# Patient Record
Sex: FEMALE | Race: WHITE | NOT HISPANIC OR LATINO | Employment: UNEMPLOYED | URBAN - METROPOLITAN AREA
[De-identification: names, ages, dates, MRNs, and addresses within clinical notes are randomized per-mention and may not be internally consistent; named-entity substitution may affect disease eponyms.]

---

## 2017-02-24 ENCOUNTER — OFFICE VISIT (OUTPATIENT)
Dept: NEUROLOGY | Facility: CLINIC | Age: 82
End: 2017-02-24
Payer: MEDICARE

## 2017-02-24 ENCOUNTER — LAB VISIT (OUTPATIENT)
Dept: LAB | Facility: HOSPITAL | Age: 82
End: 2017-02-24
Attending: PSYCHIATRY & NEUROLOGY
Payer: MEDICARE

## 2017-02-24 ENCOUNTER — TELEPHONE (OUTPATIENT)
Dept: NEUROLOGY | Facility: CLINIC | Age: 82
End: 2017-02-24

## 2017-02-24 VITALS
SYSTOLIC BLOOD PRESSURE: 140 MMHG | DIASTOLIC BLOOD PRESSURE: 72 MMHG | RESPIRATION RATE: 16 BRPM | HEART RATE: 66 BPM | BODY MASS INDEX: 26.82 KG/M2 | HEIGHT: 67 IN | WEIGHT: 170.88 LBS

## 2017-02-24 DIAGNOSIS — M48.02 CERVICAL SPINAL STENOSIS: ICD-10-CM

## 2017-02-24 DIAGNOSIS — G30.1 LATE ONSET ALZHEIMER'S DISEASE WITHOUT BEHAVIORAL DISTURBANCE: Primary | ICD-10-CM

## 2017-02-24 DIAGNOSIS — E53.8 VITAMIN B12 DEFICIENCY: ICD-10-CM

## 2017-02-24 DIAGNOSIS — F02.80 LATE ONSET ALZHEIMER'S DISEASE WITHOUT BEHAVIORAL DISTURBANCE: Primary | ICD-10-CM

## 2017-02-24 DIAGNOSIS — R26.9 ABNORMAL GAIT: ICD-10-CM

## 2017-02-24 DIAGNOSIS — I65.02 VERTEBRAL ARTERY OCCLUSION, LEFT: ICD-10-CM

## 2017-02-24 LAB — VIT B12 SERPL-MCNC: 629 PG/ML

## 2017-02-24 PROCEDURE — 36415 COLL VENOUS BLD VENIPUNCTURE: CPT

## 2017-02-24 PROCEDURE — 99999 PR PBB SHADOW E&M-EST. PATIENT-LVL III: CPT | Mod: PBBFAC,,, | Performed by: PSYCHIATRY & NEUROLOGY

## 2017-02-24 PROCEDURE — 99214 OFFICE O/P EST MOD 30 MIN: CPT | Mod: S$PBB,,, | Performed by: PSYCHIATRY & NEUROLOGY

## 2017-02-24 PROCEDURE — 82607 VITAMIN B-12: CPT

## 2017-02-24 RX ORDER — MEMANTINE HYDROCHLORIDE 10 MG/1
10 TABLET ORAL NIGHTLY
Qty: 30 TABLET | Refills: 11 | Status: SHIPPED | OUTPATIENT
Start: 2017-02-24 | End: 2018-05-28 | Stop reason: SDUPTHER
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
## 2017-02-24 NOTE — TELEPHONE ENCOUNTER
Pt called to cancel her prescription at [facility] and send it to [facility]. Spoke with Vinod at [facility] and script was deleted. Script called in at [facility]. Pt notified

## 2017-02-24 NOTE — MR AVS SNAPSHOT
[facility]  [address] [address]  Phone: [phone]  Fax: [phone]                  Selma Miguel   2017 9:15 AM   Office Visit    Description:  Female : 1932   Provider:  Farrukh Ramirez MD   Department:  [facility]           Reason for Visit     Memory Loss           Diagnoses this Visit        Comments    Late onset Alzheimer's disease without behavioral disturbance    -  Primary     Vitamin B12 deficiency         Cervical spinal stenosis         Vertebral artery occlusion, left         Abnormal gait                To Do List           Future Appointments        Provider Department Dept Phone    3/20/2017 10:00 AM Zach Ayoub MD [facility] [phone]      Goals (5 Years of Data)     None      Follow-Up and Disposition     Return in about 6 months (around 2017).       These Medications        Disp Refills Start End    memantine (NAMENDA) 10 MG Tab 30 tablet 11 2017    Take 1 tablet (10 mg total) by mouth every evening. - Oral    Pharmacy: [address] Ph #: [phone]         [facility] On Call     [facility] On Call Nurse Care Line -  Assistance  Registered nurses in the [facility] provide clinical advisement, health education, appointment booking, and other advisory services.  Call for this free service at [phone].             Medications           Message regarding Medications     Verify the changes and/or additions to your medication regime listed below are the same as discussed with your clinician today.  If any of these changes or additions are incorrect, please notify your healthcare provider.        START taking these NEW medications        Refills    memantine (NAMENDA) 10 MG Tab 11    Sig: Take 1 tablet (10 mg total) by mouth every evening.    Class: Normal    Route: Oral           Verify that the  below list of medications is an accurate representation of the medications you are currently taking.  If none reported, the list may be blank. If incorrect, please contact your healthcare provider. Carry this list with you in case of emergency.           Current Medications     aspirin 81 MG Chew Take 81 mg by mouth once daily.    atorvastatin (LIPITOR) 10 MG tablet Take 1 tablet by mouth once daily.    clopidogrel (PLAVIX) 75 mg tablet Take 1 tablet by mouth once daily.    cyanocobalamin (VITAMIN B-12) 1000 MCG tablet Take 100 mcg by mouth once daily.    donepezil (ARICEPT) 5 MG tablet Take 1/2 nightly for 6 weeks, then increase to one at night    losartan-hydrochlorothiazide 100-25 mg (HYZAAR) 100-25 mg per tablet Take 1 tablet by mouth once daily.    metformin (GLUCOPHAGE) 500 MG tablet Take 1,000 mg by mouth every evening.     multivitamin capsule Take 1 capsule by mouth once daily.    omeprazole (PRILOSEC) 40 MG capsule Take 1 capsule by mouth once daily.    pyridoxine (B-6) 100 MG Tab Take 100 mg by mouth once daily.    vitamin D 1000 units Tab Take 1,000 Units by mouth once daily.    memantine (NAMENDA) 10 MG Tab Take 1 tablet (10 mg total) by mouth every evening.           Clinical Reference Information           Your Vitals Were     BP                   140/72 (BP Location: Right arm, Patient Position: Sitting, BP Method: Manual)           Blood Pressure          Most Recent Value    BP  (!)  140/72      Allergies as of 2/24/2017     Morphine      Immunizations Administered on Date of Encounter - 2/24/2017     None      Orders Placed During Today's Visit     Future Labs/Procedures Expected by Expires    Vitamin B12  2/24/2017 2/24/2018      Language Assistance Services     ATTENTION: Language assistance services are available, free of charge. Please call [phone].      ATENCIÓN: Si jeraldla paola, tiene a marie disposición servicios gratuitos de asistencia lingüística. Llame al [phone].     CHÚ Ý:  N?u b?n nói Ti?ng Vi?t, có các d?ch v? h? tr? ngôn ng? mi?n phí dành cho b?n. G?i s? [phone].         [facility] complies with applicable Federal civil rights laws and does not discriminate on the basis of race, color, national origin, age, disability, or sex.

## 2017-05-02 ENCOUNTER — APPOINTMENT (OUTPATIENT)
Dept: RADIOLOGY | Facility: HOSPITAL | Age: 82
End: 2017-05-02
Payer: MEDICARE

## 2017-05-02 PROCEDURE — 70450 CT HEAD/BRAIN W/O DYE: CPT | Mod: TC

## 2017-05-02 PROCEDURE — 70450 CT HEAD/BRAIN W/O DYE: CPT | Mod: 26,,, | Performed by: RADIOLOGY

## 2017-06-12 RX ORDER — DONEPEZIL HYDROCHLORIDE 5 MG/1
TABLET, FILM COATED ORAL
Qty: 30 TABLET | Refills: 5 | Status: SHIPPED | OUTPATIENT
Start: 2017-06-12 | End: 2018-05-28 | Stop reason: SDUPTHER

## 2018-05-28 ENCOUNTER — OFFICE VISIT (OUTPATIENT)
Dept: NEUROLOGY | Facility: CLINIC | Age: 83
End: 2018-05-28
Payer: MEDICARE

## 2018-05-28 VITALS
SYSTOLIC BLOOD PRESSURE: 106 MMHG | WEIGHT: 206.13 LBS | HEART RATE: 78 BPM | BODY MASS INDEX: 32.35 KG/M2 | HEIGHT: 67 IN | RESPIRATION RATE: 16 BRPM | DIASTOLIC BLOOD PRESSURE: 66 MMHG

## 2018-05-28 DIAGNOSIS — I65.02 VERTEBRAL ARTERY OCCLUSION, LEFT: ICD-10-CM

## 2018-05-28 DIAGNOSIS — F02.80 LATE ONSET ALZHEIMER'S DISEASE WITHOUT BEHAVIORAL DISTURBANCE: ICD-10-CM

## 2018-05-28 DIAGNOSIS — E53.8 VITAMIN B12 DEFICIENCY: ICD-10-CM

## 2018-05-28 DIAGNOSIS — G30.1 LATE ONSET ALZHEIMER'S DISEASE WITHOUT BEHAVIORAL DISTURBANCE: ICD-10-CM

## 2018-05-28 DIAGNOSIS — M48.02 CERVICAL SPINAL STENOSIS: ICD-10-CM

## 2018-05-28 DIAGNOSIS — N39.498 OTHER URINARY INCONTINENCE: Primary | ICD-10-CM

## 2018-05-28 PROCEDURE — 99999 PR PBB SHADOW E&M-EST. PATIENT-LVL III: CPT | Mod: PBBFAC,,, | Performed by: PSYCHIATRY & NEUROLOGY

## 2018-05-28 PROCEDURE — 99214 OFFICE O/P EST MOD 30 MIN: CPT | Mod: S$PBB | Performed by: PSYCHIATRY & NEUROLOGY

## 2018-05-28 PROCEDURE — 99213 OFFICE O/P EST LOW 20 MIN: CPT | Mod: PBBFAC | Performed by: PSYCHIATRY & NEUROLOGY

## 2018-05-28 PROCEDURE — 99999 PR STA SHADOW: CPT | Mod: PBBFAC,,, | Performed by: PSYCHIATRY & NEUROLOGY

## 2018-05-28 RX ORDER — DONEPEZIL HYDROCHLORIDE 5 MG/1
5 TABLET, FILM COATED ORAL NIGHTLY
Qty: 90 TABLET | Refills: 3 | Status: SHIPPED | OUTPATIENT
Start: 2018-05-28 | End: 2019-04-22 | Stop reason: SDUPTHER

## 2018-05-28 RX ORDER — MEMANTINE HYDROCHLORIDE 10 MG/1
10 TABLET ORAL NIGHTLY
Qty: 180 TABLET | Refills: 3 | Status: SHIPPED | OUTPATIENT
Start: 2018-05-28 | End: 2018-06-01 | Stop reason: DRUGHIGH

## 2018-05-28 NOTE — PROGRESS NOTES
HPI:   Selma Miguel is a 85 y.o. female with HTN, DM, and dyslipidemia with not unexpected white matter changes on MRI brain done for headache which is right side locked and nightly (occurs while in bed). 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 Some arthritic changes in the neck / suspect pain is radiating from the neck.   Some falls  In 2015 with 6/2015 MRI C spine showing moderate to severe cervical spinal stenosis and Memory loss/ likely progressing to Alzheimer's dementia    It has been over a year since patient's last visit with me.  She did fall and hit her head a year ago and had unremarkable CT head done in ER (see below)  She was started on Namenda at her last visit with me.  She is tolerating this well.No headaches  Memory is stable.   She is no longer driving and she lives alone. She has a sitter during the day and then family stays with her until bedtime. She does not wander out of the house. No hallucinations.   Her last fall was over a year ago.   She has not neck pain  Patient has incontinence- only over the past 2 months. She feels no burning and no blood in the urine.   No knee pain  No further alopecia    Review of Systems   Constitutional: Negative for fever.   HENT: Negative for nosebleeds.    Eyes: Negative for double vision.   Respiratory: Negative for hemoptysis.    Cardiovascular: Negative for leg swelling.   Gastrointestinal: Negative for blood in stool.   Genitourinary: Negative for hematuria.   Musculoskeletal: Negative for falls.   Skin: Negative for rash.   Neurological: Negative for headaches.   Psychiatric/Behavioral: Positive for memory loss.       Exam:  Gen Appearance, well developed/nourished in no apparent distress  CV: 2+ distal pulses with no edema or swelling  Neuro:  MS: Awake, alert,  Sustains attention.Oriented to time as memorial day but can't recall month.  Recent recall is mildly  to moderately forgetful at times/ remote memory intact, Language is full to spontaneous speech/comprehension. Fund of Knowledge is full.   Up to date on current events  CN:  PERRL, Extraoccular movements and visual fields are full. Normal facial  strength, Tongue and Palate are midline and strong. Shoulder Shrug symmetric and strong.  Motor: Normal bulk, tone, no abnormal movements. 5/5 strength bilateral upper/lower extremities with 1+ reflexes   Sensory: symmetric to  temp, and vibration, Romberg negative  Cerebellar: Finger-nose, Rapid alternating movements intact  Gait: Normal stance, no ataxia with cane but chronic right feet deviation more than left post knee replacement. -- needs walker instead of cane again urged today      Imaging: MRI C spine 6/2015 : Multilevel degenerative changes of the cervical spine contribute to moderate to severe central canal stenosis and moderate to severe neural foraminal narrowing as detailed in the above report.    Lack of a normal flow void in the left vertebral artery which could reflect slow flow or occlusion. Consider correlation with CTA as warranted.        Labs: 12/2015 TSh normal B12 low normal and patient was instructed on taking po b12      4/2016 MRI brain: Atrophy and scatter white matter disease reported  Also some mastoid changes    5/2017 CT head: No acute intracranial abnormalities.    Stable sequela of chronic microvascular ischemic change and senescent changes.    Soft tissue edema overlying the right frontal calvarium without underlying fracture.    4/2016 CMP, TSH, CBC with PCP unremarkable. LDL is 88  2/2017 B12 level well corrected    Assessment/Plan: Selma Miguel is a 85 y.o. female with HTN, DM, and dyslipidemia with not unexpected white matter changes on MRI brain done for headache which is right side locked and nightly (occurs while in bed). 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 Some arthritic changes in the neck / suspect pain is radiating from the neck.   Some falls  In 2015 with 6/2015 MRI C spine showing moderate to severe cervical spinal stenosis and Memory loss/ likely progressing to Alzheimer's dementia    I recommend:  1. Her moderate-severe cervical spinal stenosis could contribute to falls. She refused spine surgical consult-all asymptomatic again currently/ no longer having headache or falls  2. Noted is development of worsening memory loss--progressive with time with unremarkable MRI brain in 2016. Continue B12 for low normal levels prior-improved in 2017.Continue current dose Aricept and Namenda as memory has been more stable lately  3. She is having more recent bladder incontinence- check UA today and if negative I suggested time voiding.   4. She is tolerating living along at night with sitters and family helping during the day. Will need 24 hour supervision if any wandering or further confusion. She no longer drives  5.  She also had some lack of sensation in the feet which suggests possible diabetic neuropathy as well (held on EMG given  patient's request-- she has no active symptoms now). She also has chronic orthopedic problem problems with the knees/ feet deviation-- this all also contributes to falls / fall precautions reviewed. PT helped  Prior.   6. Continue ASA started since vertebral a stenosis found (in addition to Plavix). Also on anti-HTN and statin for CVA prevention/plaque reduction (PCP follows labs). She will also continue Prilosec for esogastitis (benefit for GERD greater than risk of reduced efficacy of plavix as she has never had a stroke)      RTC 1 year

## 2018-05-29 ENCOUNTER — LAB VISIT (OUTPATIENT)
Dept: LAB | Facility: HOSPITAL | Age: 83
End: 2018-05-29
Attending: PSYCHIATRY & NEUROLOGY
Payer: MEDICARE

## 2018-05-29 DIAGNOSIS — N39.498 OTHER URINARY INCONTINENCE: ICD-10-CM

## 2018-05-29 LAB
BACTERIA #/AREA URNS HPF: ABNORMAL /HPF
BILIRUB UR QL STRIP: NEGATIVE
CLARITY UR: CLEAR
COLOR UR: YELLOW
GLUCOSE UR QL STRIP: NEGATIVE
HGB UR QL STRIP: NEGATIVE
KETONES UR QL STRIP: NEGATIVE
LEUKOCYTE ESTERASE UR QL STRIP: ABNORMAL
MICROSCOPIC COMMENT: ABNORMAL
NITRITE UR QL STRIP: NEGATIVE
PH UR STRIP: 5 [PH] (ref 5–8)
PROT UR QL STRIP: NEGATIVE
SP GR UR STRIP: 1.01 (ref 1–1.03)
URN SPEC COLLECT METH UR: ABNORMAL
UROBILINOGEN UR STRIP-ACNC: NEGATIVE EU/DL
WBC #/AREA URNS HPF: 8 /HPF (ref 0–5)

## 2018-05-29 PROCEDURE — 81000 URINALYSIS NONAUTO W/SCOPE: CPT

## 2018-06-01 ENCOUNTER — TELEPHONE (OUTPATIENT)
Dept: NEUROLOGY | Facility: CLINIC | Age: 83
End: 2018-06-01

## 2018-06-01 RX ORDER — MEMANTINE HYDROCHLORIDE 28 MG/1
1 CAPSULE, EXTENDED RELEASE ORAL NIGHTLY
COMMUNITY
End: 2021-05-25

## 2018-06-01 NOTE — TELEPHONE ENCOUNTER
Received a call from patients pharmacy, she does the [facility] pack for her medications, her PCP sent in prescriptions for all her medications and when we sent in prescriptions on Monday there was confusion to the dosage on the Namenda as the PCP has her on Namenda XR 28 mg nightly and we sent in Namenda 10 mg nightly as this is what we had been having patient on. Corrected the medication in the chart as the patient has been on this dose for sometime now clarified with family.

## 2018-06-01 NOTE — TELEPHONE ENCOUNTER
----- Message from Yulia Mcrae MA sent at 2018 10:09 AM CDT -----  Contact: Susan-- [facility]  Selma Miguel  MRN: [id number]  : 1932  PCP: Montrell Trujillo  Home Phone      [phone]  Work Phone      Not on file.  Mobile          [phone]      MESSAGE:  Pharmacist is requesting to speak to you in reference to clarification on patient's Namenda  10 mg. She can be reached at [phone].

## 2018-12-10 ENCOUNTER — TELEPHONE (OUTPATIENT)
Dept: INTERNAL MEDICINE | Facility: CLINIC | Age: 83
End: 2018-12-10

## 2018-12-10 DIAGNOSIS — R41.0 CONFUSION: ICD-10-CM

## 2018-12-10 DIAGNOSIS — R30.0 BURNING WITH URINATION: Primary | ICD-10-CM

## 2018-12-11 ENCOUNTER — CLINICAL SUPPORT (OUTPATIENT)
Dept: INTERNAL MEDICINE | Facility: CLINIC | Age: 83
End: 2018-12-11
Payer: MEDICARE

## 2018-12-11 DIAGNOSIS — R41.0 CONFUSION: ICD-10-CM

## 2018-12-11 DIAGNOSIS — R30.0 BURNING WITH URINATION: ICD-10-CM

## 2018-12-11 LAB
BACTERIA #/AREA URNS AUTO: ABNORMAL /HPF
BILIRUB UR QL STRIP: NEGATIVE
CLARITY UR REFRACT.AUTO: CLEAR
COLOR UR AUTO: ABNORMAL
GLUCOSE UR QL STRIP: NEGATIVE
HGB UR QL STRIP: ABNORMAL
KETONES UR QL STRIP: NEGATIVE
LEUKOCYTE ESTERASE UR QL STRIP: ABNORMAL
MICROSCOPIC COMMENT: ABNORMAL
NITRITE UR QL STRIP: NEGATIVE
PH UR STRIP: 6 [PH] (ref 5–8)
PROT UR QL STRIP: NEGATIVE
RBC #/AREA URNS AUTO: 3 /HPF (ref 0–4)
SP GR UR STRIP: 1 (ref 1–1.03)
SQUAMOUS #/AREA URNS AUTO: 2 /HPF
URN SPEC COLLECT METH UR: ABNORMAL
WBC #/AREA URNS AUTO: 12 /HPF (ref 0–5)

## 2018-12-11 PROCEDURE — 81001 URINALYSIS AUTO W/SCOPE: CPT

## 2018-12-11 PROCEDURE — 87086 URINE CULTURE/COLONY COUNT: CPT

## 2018-12-12 LAB — BACTERIA UR CULT: NORMAL

## 2019-04-22 RX ORDER — DONEPEZIL HYDROCHLORIDE 5 MG/1
TABLET, FILM COATED ORAL
Qty: 90 TABLET | Refills: 3 | Status: SHIPPED | OUTPATIENT
Start: 2019-04-22 | End: 2020-04-23

## 2019-04-27 ENCOUNTER — LAB VISIT (OUTPATIENT)
Dept: LAB | Facility: HOSPITAL | Age: 84
End: 2019-04-27
Attending: FAMILY MEDICINE
Payer: MEDICARE

## 2019-04-27 DIAGNOSIS — E11.9 DM TYPE 2 (DIABETES MELLITUS, TYPE 2): Primary | ICD-10-CM

## 2019-04-27 DIAGNOSIS — I10 BP (HIGH BLOOD PRESSURE): ICD-10-CM

## 2019-04-27 DIAGNOSIS — Z00.01 ENCOUNTER FOR GENERAL ADULT MEDICAL EXAMINATION WITH ABNORMAL FINDINGS: ICD-10-CM

## 2019-04-27 LAB
25(OH)D3+25(OH)D2 SERPL-MCNC: 52 NG/ML (ref 30–96)
ALBUMIN SERPL BCP-MCNC: 3.5 G/DL (ref 3.5–5.2)
ALP SERPL-CCNC: 60 U/L (ref 55–135)
ALT SERPL W/O P-5'-P-CCNC: 13 U/L (ref 10–44)
ANION GAP SERPL CALC-SCNC: 11 MMOL/L (ref 8–16)
AST SERPL-CCNC: 14 U/L (ref 10–40)
BILIRUB SERPL-MCNC: 0.4 MG/DL (ref 0.1–1)
BUN SERPL-MCNC: 18 MG/DL (ref 8–23)
CALCIUM SERPL-MCNC: 9.4 MG/DL (ref 8.7–10.5)
CHLORIDE SERPL-SCNC: 105 MMOL/L (ref 95–110)
CHOLEST SERPL-MCNC: 153 MG/DL (ref 120–199)
CHOLEST/HDLC SERPL: 3.3 {RATIO} (ref 2–5)
CO2 SERPL-SCNC: 26 MMOL/L (ref 23–29)
CREAT SERPL-MCNC: 1 MG/DL (ref 0.5–1.4)
ERYTHROCYTE [DISTWIDTH] IN BLOOD BY AUTOMATED COUNT: 16.3 % (ref 11.5–14.5)
EST. GFR  (AFRICAN AMERICAN): 59 ML/MIN/1.73 M^2
EST. GFR  (NON AFRICAN AMERICAN): 51 ML/MIN/1.73 M^2
GLUCOSE SERPL-MCNC: 142 MG/DL (ref 70–110)
HCT VFR BLD AUTO: 46.5 % (ref 37–48.5)
HDLC SERPL-MCNC: 46 MG/DL (ref 40–75)
HDLC SERPL: 30.1 % (ref 20–50)
HGB BLD-MCNC: 14.8 G/DL (ref 12–16)
LDLC SERPL CALC-MCNC: 84 MG/DL (ref 63–159)
MCH RBC QN AUTO: 29.1 PG (ref 27–31)
MCHC RBC AUTO-ENTMCNC: 31.8 G/DL (ref 32–36)
MCV RBC AUTO: 91 FL (ref 82–98)
NONHDLC SERPL-MCNC: 107 MG/DL
PLATELET # BLD AUTO: 274 K/UL (ref 150–350)
PMV BLD AUTO: 10.6 FL (ref 9.2–12.9)
POTASSIUM SERPL-SCNC: 4.6 MMOL/L (ref 3.5–5.1)
PROT SERPL-MCNC: 6.6 G/DL (ref 6–8.4)
RBC # BLD AUTO: 5.09 M/UL (ref 4–5.4)
SODIUM SERPL-SCNC: 142 MMOL/L (ref 136–145)
TRIGL SERPL-MCNC: 115 MG/DL (ref 30–150)
WBC # BLD AUTO: 6.66 K/UL (ref 3.9–12.7)

## 2019-04-27 PROCEDURE — 80053 COMPREHEN METABOLIC PANEL: CPT

## 2019-04-27 PROCEDURE — 82306 VITAMIN D 25 HYDROXY: CPT

## 2019-04-27 PROCEDURE — 85027 COMPLETE CBC AUTOMATED: CPT

## 2019-04-27 PROCEDURE — 82652 VIT D 1 25-DIHYDROXY: CPT

## 2019-04-27 PROCEDURE — 80061 LIPID PANEL: CPT

## 2019-04-27 PROCEDURE — 36415 COLL VENOUS BLD VENIPUNCTURE: CPT

## 2019-04-30 LAB — 1,25(OH)2D3 SERPL-MCNC: 39 PG/ML (ref 20–79)

## 2019-08-05 ENCOUNTER — CLINICAL SUPPORT (OUTPATIENT)
Dept: INTERNAL MEDICINE | Facility: CLINIC | Age: 84
End: 2019-08-05
Payer: MEDICARE

## 2019-08-05 ENCOUNTER — TELEPHONE (OUTPATIENT)
Dept: INTERNAL MEDICINE | Facility: CLINIC | Age: 84
End: 2019-08-05

## 2019-08-05 DIAGNOSIS — F44.89 CONFUSION STATE: Primary | ICD-10-CM

## 2019-08-05 DIAGNOSIS — F44.89 CONFUSION STATE: ICD-10-CM

## 2019-08-05 DIAGNOSIS — N39.0 ACUTE UTI: ICD-10-CM

## 2019-08-05 LAB
BILIRUB UR QL STRIP: NEGATIVE
CLARITY UR REFRACT.AUTO: CLEAR
COLOR UR AUTO: ABNORMAL
GLUCOSE UR QL STRIP: NEGATIVE
HGB UR QL STRIP: NEGATIVE
KETONES UR QL STRIP: NEGATIVE
LEUKOCYTE ESTERASE UR QL STRIP: ABNORMAL
MICROSCOPIC COMMENT: NORMAL
NITRITE UR QL STRIP: NEGATIVE
PH UR STRIP: 7 [PH] (ref 5–8)
PROT UR QL STRIP: NEGATIVE
RBC #/AREA URNS AUTO: 1 /HPF (ref 0–4)
SP GR UR STRIP: 1 (ref 1–1.03)
SQUAMOUS #/AREA URNS AUTO: 1 /HPF
URN SPEC COLLECT METH UR: ABNORMAL
WBC #/AREA URNS AUTO: 2 /HPF (ref 0–5)

## 2019-08-05 PROCEDURE — 87086 URINE CULTURE/COLONY COUNT: CPT

## 2019-08-05 PROCEDURE — 81001 URINALYSIS AUTO W/SCOPE: CPT

## 2019-08-06 LAB
BACTERIA UR CULT: NORMAL
BACTERIA UR CULT: NORMAL

## 2019-08-08 ENCOUNTER — TELEPHONE (OUTPATIENT)
Dept: INTERNAL MEDICINE | Facility: CLINIC | Age: 84
End: 2019-08-08

## 2019-08-08 DIAGNOSIS — N39.0 URINARY TRACT INFECTION WITHOUT HEMATURIA, SITE UNSPECIFIED: Primary | ICD-10-CM

## 2019-08-08 RX ORDER — CIPROFLOXACIN 500 MG/1
500 TABLET ORAL 2 TIMES DAILY
Qty: 10 TABLET | Refills: 0 | Status: SHIPPED | OUTPATIENT
Start: 2019-08-08 | End: 2019-08-13

## 2019-08-13 ENCOUNTER — OFFICE VISIT (OUTPATIENT)
Dept: NEUROLOGY | Facility: CLINIC | Age: 84
End: 2019-08-13
Payer: MEDICARE

## 2019-08-13 VITALS
WEIGHT: 204.56 LBS | BODY MASS INDEX: 32.11 KG/M2 | HEART RATE: 89 BPM | HEIGHT: 67 IN | SYSTOLIC BLOOD PRESSURE: 126 MMHG | RESPIRATION RATE: 16 BRPM | DIASTOLIC BLOOD PRESSURE: 72 MMHG

## 2019-08-13 DIAGNOSIS — G30.1 LATE ONSET ALZHEIMER'S DISEASE WITHOUT BEHAVIORAL DISTURBANCE: Primary | ICD-10-CM

## 2019-08-13 DIAGNOSIS — E53.8 VITAMIN B12 DEFICIENCY: ICD-10-CM

## 2019-08-13 DIAGNOSIS — M48.02 CERVICAL SPINAL STENOSIS: ICD-10-CM

## 2019-08-13 DIAGNOSIS — F02.80 LATE ONSET ALZHEIMER'S DISEASE WITHOUT BEHAVIORAL DISTURBANCE: Primary | ICD-10-CM

## 2019-08-13 PROCEDURE — 99214 OFFICE O/P EST MOD 30 MIN: CPT | Mod: S$GLB,,, | Performed by: PSYCHIATRY & NEUROLOGY

## 2019-08-13 PROCEDURE — 3288F FALL RISK ASSESSMENT DOCD: CPT | Mod: CPTII,S$GLB,, | Performed by: PSYCHIATRY & NEUROLOGY

## 2019-08-13 PROCEDURE — 99999 PR PBB SHADOW E&M-EST. PATIENT-LVL III: CPT | Mod: PBBFAC,,, | Performed by: PSYCHIATRY & NEUROLOGY

## 2019-08-13 PROCEDURE — 1100F PR PT FALLS ASSESS DOC 2+ FALLS/FALL W/INJURY/YR: ICD-10-PCS | Mod: CPTII,S$GLB,, | Performed by: PSYCHIATRY & NEUROLOGY

## 2019-08-13 PROCEDURE — 99999 PR PBB SHADOW E&M-EST. PATIENT-LVL III: ICD-10-PCS | Mod: PBBFAC,,, | Performed by: PSYCHIATRY & NEUROLOGY

## 2019-08-13 PROCEDURE — 3288F PR FALLS RISK ASSESSMENT DOCUMENTED: ICD-10-PCS | Mod: CPTII,S$GLB,, | Performed by: PSYCHIATRY & NEUROLOGY

## 2019-08-13 PROCEDURE — 1100F PTFALLS ASSESS-DOCD GE2>/YR: CPT | Mod: CPTII,S$GLB,, | Performed by: PSYCHIATRY & NEUROLOGY

## 2019-08-13 PROCEDURE — 99214 PR OFFICE/OUTPT VISIT, EST, LEVL IV, 30-39 MIN: ICD-10-PCS | Mod: S$GLB,,, | Performed by: PSYCHIATRY & NEUROLOGY

## 2019-08-13 RX ORDER — TRAZODONE HYDROCHLORIDE 50 MG/1
50 TABLET ORAL NIGHTLY
Qty: 30 TABLET | Refills: 11 | Status: SHIPPED | OUTPATIENT
Start: 2019-08-13 | End: 2020-06-22

## 2019-08-13 RX ORDER — LOSARTAN POTASSIUM 50 MG/1
50 TABLET ORAL NIGHTLY
COMMUNITY
End: 2021-05-25

## 2019-08-13 RX ORDER — ESCITALOPRAM OXALATE 10 MG/1
10 TABLET ORAL NIGHTLY
COMMUNITY
End: 2019-08-13

## 2019-08-13 NOTE — PROGRESS NOTES
HPI:   Selma Miguel is a 85 y.o. female with HTN, DM, and dyslipidemia with not unexpected white matter changes on MRI brain done for headache which is right side locked and nightly (occurs while in bed). 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 Some arthritic changes in the neck / suspect pain is radiating from the neck.   Some falls  In  with 2015 MRI C spine showing moderate to severe cervical spinal stenosis and Memory loss/ likely progressing to Alzheimer's dementia      Patient is here for her yearly follow up.   UA unremarkable at the last visit  The patient is on Namenda XR\    For the past 6 months, she has been worsening.   Family today states she has some difficulty with short term memory which continues and over time she has had some visual hallucinations. These are well formed- she is seeing things like young children. She had wandered out into her yard last week thinking she was at a  and could not find the doors.  She is living alone. Hallucinations are upsetting to the patient.   Last week, had UA with PCP and was treated for this by PCP (no organisms isolated). She was just treated on Thursday for presumed UTI.   Patient has a sitter during the day and she is left alone at bedtime. Except family did increase supervision since she has gotten worse and had this one wandering spell with UTI    She is on Lexapro. Mood is good.     No neck pain. One fall since the last visit without injury    Review of Systems   Unable to perform ROS: Dementia       Exam:  Gen Appearance, well developed/nourished in no apparent distress  CV: 2+ distal pulses with no edema or swelling  Neuro:  MS: Awake, alert,  Sustains attention.Oriented to time as August only.  Recent recall is moderately forgetful/ remote memory intact, Language is full to spontaneous speech/comprehension. Fund of Knowledge is full.   CN:  PERRL,  Extraoccular movements and visual fields are full. Normal facial  strength, Tongue and Palate are midline and strong. Shoulder Shrug symmetric and strong.  Motor: Normal bulk, tone, no abnormal movements. 5/5 strength bilateral upper/lower extremities with 1+ reflexes   Sensory: symmetric to  temp, and vibration, Romberg negative  Cerebellar: Finger-nose, Rapid alternating movements intact  Gait: Normal stance, no ataxia with cane but chronic right feet deviation more than left post knee replacement. -- needs walker instead of cane again urged today      Imaging: MRI C spine 6/2015 : Multilevel degenerative changes of the cervical spine contribute to moderate to severe central canal stenosis and moderate to severe neural foraminal narrowing as detailed in the above report.    Lack of a normal flow void in the left vertebral artery which could reflect slow flow or occlusion. Consider correlation with CTA as warranted.        Labs: 12/2015 TSh normal B12 low normal and patient was instructed on taking po b12      4/2016 MRI brain: Atrophy and scatter white matter disease reported  Also some mastoid changes    5/2017 CT head: No acute intracranial abnormalities.    Stable sequela of chronic microvascular ischemic change and senescent changes.    Soft tissue edema overlying the right frontal calvarium without underlying fracture.    Labs: 2019 [facility] reviewed    Assessment/Plan: Selma Miguel is a 86 y.o. female with HTN, DM, and dyslipidemia with not unexpected white matter changes on MRI brain done for headache which is right side locked and nightly (occurs while in bed). 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 Some arthritic changes in the neck / suspect pain is radiating from the neck.   Some falls  In 2015 with 6/2015 MRI C spine showing moderate to severe cervical spinal stenosis and Memory loss/ likely progressing to  Alzheimer's dementia    I recommend:  1. Her moderate-severe cervical spinal stenosis could contribute to falls. She refused spine surgical consult prior-all asymptomatic again currently/ no longer having headache or and falls are no worse/better  2. Noted is development of worsening memory loss--progressive with time with unremarkable MRI brain in 2016. Continue B12 for low normal and follow up B12 level at the next visit  -Continue current dose Aricept and Namenda   3. I suggested time voiding for her bladder incontinence prior  4. She had a wandering spell in light of recently treated UTI  -I recommend 24 hour supervision at this point  -D/c Lexapro and try Trazodone per orders Unless sedation, mood changes or other side effects as she has had some chronic hallucinations  -Patient should not be alone unless she is consistently improved on Trazodone and with time away from UTI (look for at least 3 weeks without hallucinations and wandering)  5.  She also had some lack of sensation in the feet which suggests possible diabetic neuropathy as well (held on EMG given  patient's request-- she has no active symptoms now). She also has chronic orthopedic problem problems with the knees/ feet deviation-- this all also contributes to falls / fall precautions reviewed. PT helped  Prior.   6. Continue ASA started since vertebral a stenosis found (in addition to Plavix). Also on anti-HTN and statin for CVA prevention/plaque reduction (PCP follows labs). She will also continue Prilosec for esogastitis (benefit for GERD greater than risk of reduced efficacy of plavix as she has never had a stroke)    RTC 4 months

## 2019-08-14 ENCOUNTER — TELEPHONE (OUTPATIENT)
Dept: NEUROLOGY | Facility: CLINIC | Age: 84
End: 2019-08-14

## 2019-08-14 NOTE — TELEPHONE ENCOUNTER
----- Message from Balbina Mckeon sent at 2019 12:23 PM CDT -----  Contact: Sheri- Daughter  Selma Miguel  MRN: [id number]  : 1932  PCP: Montrell Trujillo  Home Phone      [phone]  Work Phone      Not on file.  Mobile          [phone]      MESSAGE:   Sheri would like someone to call her concerning questions about her mother.  Did not want to give any further information besides they are having a family meeting tonight and has a few questions.     Phone:  [phone]

## 2019-08-14 NOTE — TELEPHONE ENCOUNTER
Patient's daughter ,Kayden, calling in with concerns of medication changes made at last visit. Kennedy states it was discussed to discontinue Lexapro, however they are concerned that she would need to be weaned off of this medication. As of now they are continuing her at 10 mg every evening due to concerns. They have not yet started the Trazodone, they are having a family meeting for their mother's care and would like to discuss this further before deciding to start her on it. They now have concerns Mrs. Miguel would need 24/7 care if she began this medication. Daughter states that they have trust in Dr. Ramirez but they did research the mediation, Trazodone and have concerns. These concerns are the side effects out weighing the risks. Sheri states the side effects they are concerned about are sedation and drowsiness and they do not want her walking around in a zombie like state or begin forgetting or not speaking as she is now.  Mrs. Miguel has sitters during the day at this time and the family has placed a camera in her bedroom and living room which they can view from anywhere on their cell phones for nightly use. The family stays with her until she is in bed for the night, she does get up but only to use the restroom.     Sheri is asking about keeping Mrs. Miguel on the Lexapro at this time and monitoring her hallucinations. If this is not the best option she asking if it is possible to wean her off the Lexapro and start her on a lowest dose of Trazodone.       Please advise.

## 2019-08-15 NOTE — TELEPHONE ENCOUNTER
Please let her know that Trazodone is in a similar class of medications as Lexapro so Lexapro would not need to be weaned to start Trazodone.  The benefits are greater than the risks, but if they would like to keep meds as is, that is fine. Its just that Trazodone should benefit her more than Lexapro at this point.   Trazodone has been shown in studies to reduce or stop hallucinations in elderly patient's with memory loss.  The patient needs to be monitored 24 hours a day if she is hallucinating or wandering. There are a lot of different ways for families to do this (sitters, cameras, Nursing home placement, having family live with this patient). As long as she is safe with there method and she is continuously monitored, that is the priority.

## 2019-08-15 NOTE — TELEPHONE ENCOUNTER
Trazodone will be started this coming Sunday. Family will continue to monitor her through the camera at this time.

## 2019-12-12 ENCOUNTER — OFFICE VISIT (OUTPATIENT)
Dept: NEUROLOGY | Facility: CLINIC | Age: 84
End: 2019-12-12
Payer: MEDICARE

## 2019-12-12 VITALS
SYSTOLIC BLOOD PRESSURE: 136 MMHG | HEART RATE: 76 BPM | HEIGHT: 67 IN | DIASTOLIC BLOOD PRESSURE: 82 MMHG | BODY MASS INDEX: 32.87 KG/M2 | RESPIRATION RATE: 16 BRPM | WEIGHT: 209.44 LBS

## 2019-12-12 DIAGNOSIS — I65.02 OCCLUSION OF LEFT VERTEBRAL ARTERY: ICD-10-CM

## 2019-12-12 DIAGNOSIS — F02.80 ALZHEIMER'S DEMENTIA WITHOUT BEHAVIORAL DISTURBANCE, UNSPECIFIED TIMING OF DEMENTIA ONSET: ICD-10-CM

## 2019-12-12 DIAGNOSIS — G30.9 ALZHEIMER'S DEMENTIA WITHOUT BEHAVIORAL DISTURBANCE, UNSPECIFIED TIMING OF DEMENTIA ONSET: ICD-10-CM

## 2019-12-12 DIAGNOSIS — R44.3 HALLUCINATIONS: ICD-10-CM

## 2019-12-12 DIAGNOSIS — G47.00 INSOMNIA, UNSPECIFIED TYPE: ICD-10-CM

## 2019-12-12 DIAGNOSIS — E53.8 B12 DEFICIENCY: ICD-10-CM

## 2019-12-12 DIAGNOSIS — M48.02 CERVICAL SPINAL STENOSIS: Primary | ICD-10-CM

## 2019-12-12 PROCEDURE — 1100F PTFALLS ASSESS-DOCD GE2>/YR: CPT | Mod: CPTII,S$GLB,, | Performed by: NURSE PRACTITIONER

## 2019-12-12 PROCEDURE — 99214 PR OFFICE/OUTPT VISIT, EST, LEVL IV, 30-39 MIN: ICD-10-PCS | Mod: S$GLB,,, | Performed by: NURSE PRACTITIONER

## 2019-12-12 PROCEDURE — 99214 OFFICE O/P EST MOD 30 MIN: CPT | Mod: S$GLB,,, | Performed by: NURSE PRACTITIONER

## 2019-12-12 PROCEDURE — 3288F FALL RISK ASSESSMENT DOCD: CPT | Mod: CPTII,S$GLB,, | Performed by: NURSE PRACTITIONER

## 2019-12-12 PROCEDURE — 1100F PR PT FALLS ASSESS DOC 2+ FALLS/FALL W/INJURY/YR: ICD-10-PCS | Mod: CPTII,S$GLB,, | Performed by: NURSE PRACTITIONER

## 2019-12-12 PROCEDURE — 99999 PR PBB SHADOW E&M-EST. PATIENT-LVL III: CPT | Mod: PBBFAC,,, | Performed by: NURSE PRACTITIONER

## 2019-12-12 PROCEDURE — 1126F AMNT PAIN NOTED NONE PRSNT: CPT | Mod: S$GLB,,, | Performed by: NURSE PRACTITIONER

## 2019-12-12 PROCEDURE — 1126F PR PAIN SEVERITY QUANTIFIED, NO PAIN PRESENT: ICD-10-PCS | Mod: S$GLB,,, | Performed by: NURSE PRACTITIONER

## 2019-12-12 PROCEDURE — 1159F MED LIST DOCD IN RCRD: CPT | Mod: S$GLB,,, | Performed by: NURSE PRACTITIONER

## 2019-12-12 PROCEDURE — 3288F PR FALLS RISK ASSESSMENT DOCUMENTED: ICD-10-PCS | Mod: CPTII,S$GLB,, | Performed by: NURSE PRACTITIONER

## 2019-12-12 PROCEDURE — 99999 PR PBB SHADOW E&M-EST. PATIENT-LVL III: ICD-10-PCS | Mod: PBBFAC,,, | Performed by: NURSE PRACTITIONER

## 2019-12-12 PROCEDURE — 1159F PR MEDICATION LIST DOCUMENTED IN MEDICAL RECORD: ICD-10-PCS | Mod: S$GLB,,, | Performed by: NURSE PRACTITIONER

## 2019-12-12 NOTE — PROGRESS NOTES
HPI:  Selma Miguel is a 87 y.o. female with HTN, DM, and dyslipidemia with not unexpected white matter changes on MRI brain done for headache which is right side locked and nightly (occurs while in bed). 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 Some arthritic changes in the neck / suspect pain is radiating from the neck. Some falls  In 2015 with 6/2015 MRI C spine showing moderate to severe cervical spinal stenosis and Memory loss/ likely progressing to Alzheimer's dementia.     She presents today for a follow up visit. Lexapro trained to Trazodone at her last visit, which was very effective for her insomnia and her hallucinations. No wandering. This only occurred once when she had an UTI.     No falls. Good appetite.     Short term memory loss is mildly progressed since her last visit.     No neck pain.     Review of Systems   Unable to perform ROS: Dementia       Exam:  Gen Appearance, well developed/nourished in no apparent distress  CV: 2+ distal pulses with no edema or swelling  Neuro:  MS: Awake, alert,  Sustains attention.Oriented to time as August only.  Recent recall is moderately forgetful/ remote memory intact, Language is full to spontaneous speech/comprehension. Fund of Knowledge is full.   CN:  PERRL, Extraoccular movements and visual fields are full. Normal facial  strength, Tongue and Palate are midline and strong. Shoulder Shrug symmetric and strong.  Motor: Normal bulk, tone, no abnormal movements. 5/5 strength bilateral upper/lower extremities with 1+ reflexes   Sensory: symmetric to  temp, and vibration, Romberg negative  Cerebellar: Finger-nose, Rapid alternating movements intact  Gait: Normal stance, no ataxia with cane but chronic right feet deviation more than left post knee replacement. -- needs walker instead of cane again urged today    Imaging:   MRI C spine 6/2015 : Multilevel degenerative changes  of the cervical spine contribute to moderate to severe central canal stenosis and moderate to severe neural foraminal narrowing as detailed in the above report.    Lack of a normal flow void in the left vertebral artery which could reflect slow flow or occlusion. Consider correlation with CTA as warranted.    Labs:   12/2015 TSh normal B12 low normal and patient was instructed on taking po b12    4/2016 MRI Brain: Atrophy and scatter white matter disease reported  Also some mastoid changes    5/2017 CT head: No acute intracranial abnormalities.    Stable sequela of chronic microvascular ischemic change and senescent changes.    Soft tissue edema overlying the right frontal calvarium without underlying fracture.    Labs: 2019 CMP reviewed    Assessment/Plan: Selma Miguel is a 87 y.o. female with HTN, DM, and dyslipidemia with not unexpected white matter changes on MRI brain done for headache which is right side locked and nightly (occurs while in bed). 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 Some arthritic changes in the neck / suspect pain is radiating from the neck. Some falls in 2015 with 6/2015 MRI C spine showing moderate to severe cervical spinal stenosis and Memory loss/ likely progressing to Alzheimer's dementia.     I recommend:  1. Her moderate-severe cervical spinal stenosis could contribute to falls. She refused spine surgical consult prior-all asymptomatic again currently/ no longer having headache or and falls are no worse/better  2. Noted is development of worsening memory loss--progressive with time with unremarkable MRI brain in 2016. Continue B12 for low normal and check B12 level today.   -Continue current dose Aricept and Namenda   3. I suggested time voiding for her bladder incontinence prior  4. She had a wandering spell in light of UTI  -recommend 24 hour supervision at this point.   -Insomnia and  hallucinations well controlled on Trazodone, which will continue.   -Took Lexapro prior for mood.   5.  She also had some lack of sensation in the feet which suggests possible diabetic neuropathy as well (held on EMG given  patient's request-- she has no active symptoms now). She also has chronic orthopedic problem problems with the knees/ feet deviation-- this all also contributes to falls / fall precautions reviewed. PT helped  Prior.   6. Continue ASA started since vertebral a stenosis found (in addition to Plavix). Also on anti-HTN and statin for CVA prevention/plaque reduction (PCP follows labs). She will also continue Prilosec for esogastitis (benefit for GERD greater than risk of reduced efficacy of plavix as she has never had a stroke)    RTC 6 months

## 2019-12-13 ENCOUNTER — CLINICAL SUPPORT (OUTPATIENT)
Dept: INTERNAL MEDICINE | Facility: CLINIC | Age: 84
End: 2019-12-13
Payer: MEDICARE

## 2019-12-13 DIAGNOSIS — E53.8 B12 DEFICIENCY: ICD-10-CM

## 2019-12-13 LAB — VIT B12 SERPL-MCNC: 1251 PG/ML (ref 210–950)

## 2019-12-13 PROCEDURE — 82607 VITAMIN B-12: CPT

## 2020-02-11 ENCOUNTER — OFFICE VISIT (OUTPATIENT)
Dept: NEUROLOGY | Facility: CLINIC | Age: 85
End: 2020-02-11
Payer: MEDICARE

## 2020-02-11 VITALS
OXYGEN SATURATION: 95 % | HEART RATE: 91 BPM | HEIGHT: 67 IN | SYSTOLIC BLOOD PRESSURE: 134 MMHG | WEIGHT: 207.25 LBS | DIASTOLIC BLOOD PRESSURE: 78 MMHG | BODY MASS INDEX: 32.53 KG/M2 | RESPIRATION RATE: 16 BRPM

## 2020-02-11 DIAGNOSIS — M48.02 CERVICAL SPINAL STENOSIS: ICD-10-CM

## 2020-02-11 DIAGNOSIS — G30.9 ALZHEIMER'S DEMENTIA WITHOUT BEHAVIORAL DISTURBANCE, UNSPECIFIED TIMING OF DEMENTIA ONSET: Primary | ICD-10-CM

## 2020-02-11 DIAGNOSIS — F02.80 ALZHEIMER'S DEMENTIA WITHOUT BEHAVIORAL DISTURBANCE, UNSPECIFIED TIMING OF DEMENTIA ONSET: Primary | ICD-10-CM

## 2020-02-11 DIAGNOSIS — E53.8 B12 DEFICIENCY: ICD-10-CM

## 2020-02-11 PROCEDURE — 99214 PR OFFICE/OUTPT VISIT, EST, LEVL IV, 30-39 MIN: ICD-10-PCS | Mod: S$GLB,,, | Performed by: PSYCHIATRY & NEUROLOGY

## 2020-02-11 PROCEDURE — 1101F PT FALLS ASSESS-DOCD LE1/YR: CPT | Mod: CPTII,S$GLB,, | Performed by: PSYCHIATRY & NEUROLOGY

## 2020-02-11 PROCEDURE — 1126F AMNT PAIN NOTED NONE PRSNT: CPT | Mod: S$GLB,,, | Performed by: PSYCHIATRY & NEUROLOGY

## 2020-02-11 PROCEDURE — 1159F PR MEDICATION LIST DOCUMENTED IN MEDICAL RECORD: ICD-10-PCS | Mod: S$GLB,,, | Performed by: PSYCHIATRY & NEUROLOGY

## 2020-02-11 PROCEDURE — 1101F PR PT FALLS ASSESS DOC 0-1 FALLS W/OUT INJ PAST YR: ICD-10-PCS | Mod: CPTII,S$GLB,, | Performed by: PSYCHIATRY & NEUROLOGY

## 2020-02-11 PROCEDURE — 1159F MED LIST DOCD IN RCRD: CPT | Mod: S$GLB,,, | Performed by: PSYCHIATRY & NEUROLOGY

## 2020-02-11 PROCEDURE — 1126F PR PAIN SEVERITY QUANTIFIED, NO PAIN PRESENT: ICD-10-PCS | Mod: S$GLB,,, | Performed by: PSYCHIATRY & NEUROLOGY

## 2020-02-11 PROCEDURE — 99214 OFFICE O/P EST MOD 30 MIN: CPT | Mod: S$GLB,,, | Performed by: PSYCHIATRY & NEUROLOGY

## 2020-02-11 PROCEDURE — 99999 PR PBB SHADOW E&M-EST. PATIENT-LVL III: CPT | Mod: PBBFAC,,, | Performed by: PSYCHIATRY & NEUROLOGY

## 2020-02-11 PROCEDURE — 99999 PR PBB SHADOW E&M-EST. PATIENT-LVL III: ICD-10-PCS | Mod: PBBFAC,,, | Performed by: PSYCHIATRY & NEUROLOGY

## 2020-02-11 NOTE — PROGRESS NOTES
"HPI:   Selma Miguel is a 85 y.o. female with HTN, DM, and dyslipidemia with not unexpected white matter changes on MRI brain done for headache which is right side locked and nightly (occurs while in bed). 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 Some arthritic changes in the neck / suspect pain is radiating from the neck.   Some falls  In 2015 with 6/2015 MRI C spine showing moderate to severe cervical spinal stenosis and Memory loss/ likely progressing to Alzheimer's dementia      Since the last visit with me, the patient has been seeing NP, Keren Maharaj, in Neurology in this clinic  She is here for her 6 months follow up    Sleep is good  Hallucinations are well controlled (rarely benign stuff)  Memory is worsening over time especially short term memory  Lives alone with family there daily and she has a sitter during the day as well.   Not wandering.  Falls not active  Numb feet not active  She os able to call family when needed  She often makes some comments that she is "looking for a man" and family reassures this      Review of Systems   Unable to perform ROS: Dementia       Exam:  Gen Appearance, well developed/nourished in no apparent distress  CV: 2+ distal pulses with no edema or swelling  Neuro:  MS: Awake, alert,  Sustains attention.Not oriented fully to time or situation.  Recent recall is moderately forgetful/ remote memory intact, Language is full to spontaneous speech/comprehension. Fund of Knowledge is full.   CN:  PERRL, Extraoccular movements and visual fields are full. Normal facial  strength, Tongue and Palate are midline and strong. Shoulder Shrug symmetric and strong.  Motor: Normal bulk, tone, no abnormal movements. 5/5 strength bilateral upper/lower extremities with 1+ reflexes   Sensory: symmetric to  temp, and vibration, Romberg negative  Cerebellar: Finger-nose, Rapid alternating movements " intact  Gait: Normal stance, no ataxia with cane but chronic right feet deviation more than left post knee replacement. -- needs walker instead of cane again urged today      Imaging: MRI C spine 6/2015 : Multilevel degenerative changes of the cervical spine contribute to moderate to severe central canal stenosis and moderate to severe neural foraminal narrowing as detailed in the above report.    Lack of a normal flow void in the left vertebral artery which could reflect slow flow or occlusion. Consider correlation with CTA as warranted.        Labs: 12/2015 TSh normal B12 low normal and patient was instructed on taking po b12      4/2016 MRI brain: Atrophy and scatter white matter disease reported  Also some mastoid changes    5/2017 CT head: No acute intracranial abnormalities.    Stable sequela of chronic microvascular ischemic change and senescent changes.    Soft tissue edema overlying the right frontal calvarium without underlying fracture.    Labs: 2019 CMP reviewed  2019 B12 well repleted    Assessment/Plan: Selma Miguel is a 87 y.o. female with HTN, DM, and dyslipidemia with not unexpected white matter changes on MRI brain done for headache which is right side locked and nightly (occurs while in bed). 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 Some arthritic changes in the neck / suspect pain is radiating from the neck.   Some falls  In 2015 with 6/2015 MRI C spine showing moderate to severe cervical spinal stenosis and Memory loss/ likely progressing to Alzheimer's dementia    I recommend:  1. Her moderate-severe cervical spinal stenosis could contribute to falls. She refused spine surgical consult prior-all asymptomatic again currently/ no longer having headache or and falls are not active  2. Noted is development of worsening memory loss--progressive with time with unremarkable MRI brain in 2016. Continue B12 for  low normal B12 levels (well repleted in 2019)  -Continue current dose Aricept and Namenda   3. Suggested time voiding for her bladder incontinence prior  4. She had a wandering spell in light of  UTI in 2010  -Recommend 24 hour supervision if hallucinations worsen again or if wandering. Tolerating staying alone at night with family very close by and sitters during the day.  -insomnia and hallucinations well controlled on Trazodone, which will continue instead of lexapro for mood  5.  She also had some lack of sensation in the feet which suggests possible diabetic neuropathy as well (held on EMG given  patient's request-- she has no active symptoms now). She also has chronic orthopedic problem problems with the knees/ feet deviation-- this all also contributes to falls / fall precautions reviewed. PT helped  Prior.   6. Continue ASA started since vertebral a stenosis found (in addition to Plavix). Also on anti-HTN and statin for CVA prevention/plaque reduction (PCP follows labs). She will also continue Prilosec for esogastitis (benefit for GERD greater than risk of reduced efficacy of plavix as she has never had a stroke)    RTC  6 months

## 2020-02-20 ENCOUNTER — TELEPHONE (OUTPATIENT)
Dept: NEUROLOGY | Facility: CLINIC | Age: 85
End: 2020-02-20

## 2020-02-20 NOTE — TELEPHONE ENCOUNTER
Patient's daughter, Sheri, notified with instructions for sleep practice and understanding was voiced.

## 2020-02-20 NOTE — TELEPHONE ENCOUNTER
Daughter states that since the night after her last visit, she has been staying up later and later, as late as 3-4 in the morning. A worker has observed pt nodding off during the day but did not state specific durations. Daughter said that trazadone has helped with hallucinations but states pt has been a lot more emotional about small things.

## 2020-02-20 NOTE — TELEPHONE ENCOUNTER
Currently, the best way to treat her sleep and mood is to improve her sleep times. It sounds like her days and nights are confused.     Review the following sleep hygiene as treatment:   Limit naps to not more than 2 per day, not longer than 20 minutes and not after 3pm.  Bed time 8 hours prior to wake time each and every day.  That is: For wake time of 7am, Time in bed should be 11pm.  Stimulate patient during the day with TV on, Lights on, Activities. Lights off at night.    If not better in 2-3 weeks, needs another clinic visit

## 2020-02-20 NOTE — TELEPHONE ENCOUNTER
"My last note says, "sleep is good."  What has changed for her in a few days.  Is she napping during the day?  Are there any new medications?  What time is she lying down to sleep, falling asleep and waking up?  Thanks  "

## 2020-02-20 NOTE — TELEPHONE ENCOUNTER
----- Message from Sherrie Espinoza sent at 2020 12:32 PM CST -----  Contact: DAUGHTER - SCOTT Miguel  MRN: [id number]  : 1932  PCP: Montrell Trujillo  Home Phone      [phone]  Work Phone      Not on file.  Mobile          [phone]      MESSAGE: Daughter states that the patient has not been sleeping and would like to see what recommendations Dr. aRmirez might have.          Phone: [phone]

## 2020-02-24 ENCOUNTER — TELEPHONE (OUTPATIENT)
Dept: NEUROLOGY | Facility: CLINIC | Age: 85
End: 2020-02-24

## 2020-02-24 NOTE — TELEPHONE ENCOUNTER
----- Message from Sherrie Espinoza sent at 2020 10:53 AM CST -----  Contact: DAUGHTER - SCOTT Miguel  MRN: [id number]  : 1932  PCP: Montrell Trujillo  Home Phone      [phone]  Work Phone      Not on file.  Mobile          [phone]      MESSAGE: Daughter states that the patient has not slept in 9 days & would like to know if there is anything that Dr. Ramirez can prescribe for her.        Phone: [phone]

## 2020-02-27 ENCOUNTER — TELEPHONE (OUTPATIENT)
Dept: NEUROLOGY | Facility: CLINIC | Age: 85
End: 2020-02-27

## 2020-02-27 NOTE — TELEPHONE ENCOUNTER
----- Message from Sherrie Espinoza sent at 2020  7:53 AM CST -----  Contact: DAUGHTER - SCOTT Miguel  MRN: [id number]  : 1932  PCP: Montrell Trujillo  Home Phone      [phone]  Work Phone      Not on file.  Mobile          [phone]      MESSAGE: The patient has an appointment with Dr. Ramirez tomorrow but the daughter would like to speak with Dr. Ramirez prior to the appointment.  She needs to discuss some things with Dr. Ramirez that she does not want to discuss in front of the patient.        Phone: [phone]

## 2020-02-28 ENCOUNTER — OFFICE VISIT (OUTPATIENT)
Dept: NEUROLOGY | Facility: CLINIC | Age: 85
End: 2020-02-28
Payer: MEDICARE

## 2020-02-28 VITALS
WEIGHT: 208.75 LBS | SYSTOLIC BLOOD PRESSURE: 136 MMHG | OXYGEN SATURATION: 95 % | RESPIRATION RATE: 14 BRPM | DIASTOLIC BLOOD PRESSURE: 84 MMHG | BODY MASS INDEX: 32.76 KG/M2 | HEIGHT: 67 IN | HEART RATE: 71 BPM

## 2020-02-28 DIAGNOSIS — G30.9 ALZHEIMER'S DEMENTIA WITHOUT BEHAVIORAL DISTURBANCE, UNSPECIFIED TIMING OF DEMENTIA ONSET: Primary | ICD-10-CM

## 2020-02-28 DIAGNOSIS — G47.09 OTHER INSOMNIA: ICD-10-CM

## 2020-02-28 DIAGNOSIS — F02.80 ALZHEIMER'S DEMENTIA WITHOUT BEHAVIORAL DISTURBANCE, UNSPECIFIED TIMING OF DEMENTIA ONSET: Primary | ICD-10-CM

## 2020-02-28 DIAGNOSIS — M48.02 CERVICAL SPINAL STENOSIS: ICD-10-CM

## 2020-02-28 DIAGNOSIS — E53.8 B12 DEFICIENCY: ICD-10-CM

## 2020-02-28 PROCEDURE — 1101F PR PT FALLS ASSESS DOC 0-1 FALLS W/OUT INJ PAST YR: ICD-10-PCS | Mod: CPTII,S$GLB,, | Performed by: PSYCHIATRY & NEUROLOGY

## 2020-02-28 PROCEDURE — 1126F PR PAIN SEVERITY QUANTIFIED, NO PAIN PRESENT: ICD-10-PCS | Mod: S$GLB,,, | Performed by: PSYCHIATRY & NEUROLOGY

## 2020-02-28 PROCEDURE — 99999 PR PBB SHADOW E&M-EST. PATIENT-LVL III: ICD-10-PCS | Mod: PBBFAC,,, | Performed by: PSYCHIATRY & NEUROLOGY

## 2020-02-28 PROCEDURE — 99214 OFFICE O/P EST MOD 30 MIN: CPT | Mod: S$GLB,,, | Performed by: PSYCHIATRY & NEUROLOGY

## 2020-02-28 PROCEDURE — 99999 PR PBB SHADOW E&M-EST. PATIENT-LVL III: CPT | Mod: PBBFAC,,, | Performed by: PSYCHIATRY & NEUROLOGY

## 2020-02-28 PROCEDURE — 99214 PR OFFICE/OUTPT VISIT, EST, LEVL IV, 30-39 MIN: ICD-10-PCS | Mod: S$GLB,,, | Performed by: PSYCHIATRY & NEUROLOGY

## 2020-02-28 PROCEDURE — 1126F AMNT PAIN NOTED NONE PRSNT: CPT | Mod: S$GLB,,, | Performed by: PSYCHIATRY & NEUROLOGY

## 2020-02-28 PROCEDURE — 1101F PT FALLS ASSESS-DOCD LE1/YR: CPT | Mod: CPTII,S$GLB,, | Performed by: PSYCHIATRY & NEUROLOGY

## 2020-02-28 PROCEDURE — 1159F PR MEDICATION LIST DOCUMENTED IN MEDICAL RECORD: ICD-10-PCS | Mod: S$GLB,,, | Performed by: PSYCHIATRY & NEUROLOGY

## 2020-02-28 PROCEDURE — 1159F MED LIST DOCD IN RCRD: CPT | Mod: S$GLB,,, | Performed by: PSYCHIATRY & NEUROLOGY

## 2020-02-28 RX ORDER — QUETIAPINE FUMARATE 50 MG/1
TABLET, FILM COATED ORAL
Qty: 30 TABLET | Refills: 11 | Status: SHIPPED | OUTPATIENT
Start: 2020-02-28 | End: 2020-03-23

## 2020-02-28 NOTE — PROGRESS NOTES
HPI:   Selma Miguel is a 85 y.o. female with HTN, DM, and dyslipidemia with not unexpected white matter changes on MRI brain done for headache which is right side locked and nightly (occurs while in bed). 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 Some arthritic changes in the neck / suspect pain is radiating from the neck.   Some falls  In 2015 with 6/2015 MRI C spine showing moderate to severe cervical spinal stenosis and Memory loss/ likely progressing to Alzheimer's dementia      The patient is here 2 weeks since her last appointment complaining of insomnia.  Sleep hygiene was reviewed by phone with family.  Family reports crying spells often in the evening. The patient cries about her prior house being sold. She fears she forgot things in the house. She seems a bit paranoid over money worries (she does not manage her money). She has cried for hours. Naps a little during the day but up a great deal at night. She seems depressed to family and worried. No hallucinations with trazodone. Lexparo did not help hallucinations but seemed to help mood more.     The rest of her symptoms are the same. Not falling, no neck pain and no feet pain    Review of Systems   Unable to perform ROS: Dementia       Exam:  Gen Appearance, well developed/nourished in no apparent distress  CV: 2+ distal pulses with no edema or swelling  Neuro:  MS: Awake, alert,  Sustains attention.Not oriented fully to time or situation.  Recent recall is moderately forgetful/ remote memory intact, Language is full to spontaneous speech/comprehension. Fund of Knowledge is full.   CN:  PERRL, Extraoccular movements and visual fields are full. Normal facial  strength, Tongue and Palate are midline and strong. Shoulder Shrug symmetric and strong.  Motor: Normal bulk, tone, no abnormal movements. 5/5 strength bilateral upper/lower extremities with 1+ reflexes   Sensory:  symmetric to  temp, and vibration, Romberg negative  Cerebellar: Finger-nose, Rapid alternating movements intact  Gait: Normal stance, no ataxia with cane but chronic right feet deviation more than left post knee replacement. -- needs walker instead of cane again urged today      Imaging: MRI C spine 6/2015 : Multilevel degenerative changes of the cervical spine contribute to moderate to severe central canal stenosis and moderate to severe neural foraminal narrowing as detailed in the above report.    Lack of a normal flow void in the left vertebral artery which could reflect slow flow or occlusion. Consider correlation with CTA as warranted.        Labs: 12/2015 TSh normal B12 low normal and patient was instructed on taking po b12      4/2016 MRI brain: Atrophy and scatter white matter disease reported  Also some mastoid changes    5/2017 CT head: No acute intracranial abnormalities.    Stable sequela of chronic microvascular ischemic change and senescent changes.    Soft tissue edema overlying the right frontal calvarium without underlying fracture.    Labs: 2019 CMP reviewed  2019 B12 well repleted    Assessment/Plan: Selma Miguel is a 87 y.o. female with HTN, DM, and dyslipidemia with not unexpected white matter changes on MRI brain done for headache which is right side locked and nightly (occurs while in bed). 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 Some arthritic changes in the neck / suspect pain is radiating from the neck.   Some falls  In 2015 with 6/2015 MRI C spine showing moderate to severe cervical spinal stenosis and Memory loss/ likely progressing to Alzheimer's dementia    I recommend:  1. Her moderate-severe cervical spinal stenosis could contribute to falls. She refused spine surgical consult prior-all asymptomatic again currently/ no longer having headache or and falls are not active  2. Noted is  development of worsening memory loss--progressive with time with unremarkable MRI brain in 2016. Continue B12 for low normal B12 levels (well repleted in 2019)  -Continue current dose Aricept and Namenda   3. Suggested time voiding for her bladder incontinence prior  4. She had a wandering spell in light of  UTI in 2019  -Recommend 24 hour supervision if hallucinations worsen again or if wandering. Tolerating staying alone at night with family very close by and sitters during the day.  -hallucinations well controlled on Trazodone, which will continue instead of lexapro for mood  -however, she had some worsening depression, excessive worry, and insomnia: Add Seroquel low dose unless sedation. Black box risk in elderly and risk of movement disorders reviewed. Need to check fasting labs routinely with PCP on this med  -consider tapering trazodone if sedation  5.  She also had some lack of sensation in the feet which suggests possible diabetic neuropathy as well (held on EMG given  patient's request-- she has no active symptoms now). She also has chronic orthopedic problem problems with the knees/ feet deviation-- this all also contributes to falls / fall precautions reviewed. PT helped  Prior.   6. Continue ASA started since vertebral a stenosis found (in addition to Plavix). Also on anti-HTN and statin for CVA prevention/plaque reduction (PCP follows labs). She will also continue Prilosec for esogastitis (benefit for GERD greater than risk of reduced efficacy of plavix as she has never had a stroke)    RTC as scheduled

## 2020-03-23 ENCOUNTER — TELEPHONE (OUTPATIENT)
Dept: NEUROLOGY | Facility: CLINIC | Age: 85
End: 2020-03-23

## 2020-03-23 RX ORDER — QUETIAPINE FUMARATE 25 MG/1
25 TABLET, FILM COATED ORAL NIGHTLY
Qty: 90 TABLET | Refills: 3 | Status: SHIPPED | OUTPATIENT
Start: 2020-03-23 | End: 2020-04-21

## 2020-03-23 NOTE — TELEPHONE ENCOUNTER
----- Message from Sherrie Espinoza sent at 3/23/2020 12:37 PM CDT -----  Contact: DAUGHTER - SCOTT Miguel  MRN: [id number]  : 1932  PCP: Montrell Trujillo  Home Phone      [phone]  Work Phone      Not on file.  Mobile          [phone]      MESSAGE: Patient needs a refill on Seroquel 25 mg 1 at bedtime sent to [facility].  The daughter states that Dr. Ramirez was having the patient cut the 50 mg in 1/2 and that is the dose that they would like to stay on instead of increasing it as Dr. Ramirez suggested.  She says that she is doing fine on the lower dose.        Phone: [phone]

## 2020-04-20 ENCOUNTER — TELEPHONE (OUTPATIENT)
Dept: NEUROLOGY | Facility: CLINIC | Age: 85
End: 2020-04-20

## 2020-04-20 NOTE — TELEPHONE ENCOUNTER
----- Message from Sherrie Espinoza sent at 2020  2:04 PM CDT -----  Contact: DAUGHTER - SCOTT Miguel  MRN: [id number]  : 1932  PCP: Montrell Trujillo  Home Phone      [phone]  Work Phone      Not on file.  Mobile          [phone]      MESSAGE: Daughter states that the patient has become very depressed to where all she does is cries all day and would like to know if there is something that Dr. Ramirez can prescribe her something for this.

## 2020-04-20 NOTE — TELEPHONE ENCOUNTER
Patients daughter Sheri states that her mom has become very depressed and is on day 12 of crying all day. Daughter states that the patient looks terrible, is very anxious-recently house was sold and is upset about that, constantly asking where her money is-starts crying at around 11 am and goes to the evening. She is sleeping well at night although. Patient is taking Seroquel 25 mg qhs and Trazodone 50 mg qhs. Daughter is asking if taking the Seroquel in the daytime would be better? Also, asking if its necessary for the patient to be taking Namenda and Aricept as she does not feel it is helping much. Please advise.

## 2020-04-20 NOTE — TELEPHONE ENCOUNTER
I am sorry to hear it.  Would they be willing to try Seroquel twice daily? This might help her mood more.  To the ER if she is much worse/ a threat to self or others.  Otherwise, we think Namenda and Aricept should help to prevent a great deal of other symptoms like severe hallucinations, but we can never be sure of the effect. At this point, I would recommend they continue those meds for now.

## 2020-04-21 RX ORDER — QUETIAPINE FUMARATE 25 MG/1
25 TABLET, FILM COATED ORAL 2 TIMES DAILY
Qty: 180 TABLET | Refills: 3 | Status: SHIPPED | OUTPATIENT
Start: 2020-04-21 | End: 2021-03-15

## 2020-04-23 ENCOUNTER — TELEPHONE (OUTPATIENT)
Dept: NEUROLOGY | Facility: CLINIC | Age: 85
End: 2020-04-23

## 2020-04-23 RX ORDER — DONEPEZIL HYDROCHLORIDE 5 MG/1
TABLET, FILM COATED ORAL
Qty: 90 TABLET | Refills: 3 | Status: SHIPPED | OUTPATIENT
Start: 2020-04-23 | End: 2021-03-15

## 2020-04-23 NOTE — TELEPHONE ENCOUNTER
----- Message from Linda Goss sent at 2020 10:37 AM CDT -----  Contact: Sheri - daughter  Selma Miguel  MRN: [id number]  : 1932  PCP: Montrell Trujillo  Home Phone      [phone]  Work Phone      Not on file.  Mobile          [phone]      MESSAGE:     Daughter asking to speak to the nurse about the medications the pt was given yesterday     [phone]

## 2020-04-23 NOTE — TELEPHONE ENCOUNTER
Yes. That is fine. Another thing she can do if the patient seems too drowsy still is to change that to 1.5 tablets of the seroquel at night instead.

## 2020-04-23 NOTE — TELEPHONE ENCOUNTER
Patients daughter Sheri states that they have given the additional Seroquel 25 mg in the morning for the last few days and they patient is sleeping more during the daytime. Yesterday, she woke at 10:30 am, and took a nap from 12:30-4:30 pm. Sheri states that she gave her .5 tab (12.5 mg) today just to see if that would help. She wants to know if this is ok with you. Please advise.

## 2020-08-11 ENCOUNTER — OFFICE VISIT (OUTPATIENT)
Dept: NEUROLOGY | Facility: CLINIC | Age: 85
End: 2020-08-11
Payer: MEDICARE

## 2020-08-11 VITALS
HEIGHT: 67 IN | WEIGHT: 211 LBS | DIASTOLIC BLOOD PRESSURE: 78 MMHG | TEMPERATURE: 97 F | OXYGEN SATURATION: 96 % | BODY MASS INDEX: 33.12 KG/M2 | RESPIRATION RATE: 16 BRPM | HEART RATE: 85 BPM | SYSTOLIC BLOOD PRESSURE: 122 MMHG

## 2020-08-11 DIAGNOSIS — G30.9 ALZHEIMER'S DEMENTIA WITHOUT BEHAVIORAL DISTURBANCE, UNSPECIFIED TIMING OF DEMENTIA ONSET: Primary | ICD-10-CM

## 2020-08-11 DIAGNOSIS — F02.80 ALZHEIMER'S DEMENTIA WITHOUT BEHAVIORAL DISTURBANCE, UNSPECIFIED TIMING OF DEMENTIA ONSET: Primary | ICD-10-CM

## 2020-08-11 DIAGNOSIS — E53.8 B12 DEFICIENCY: ICD-10-CM

## 2020-08-11 DIAGNOSIS — G47.09 OTHER INSOMNIA: ICD-10-CM

## 2020-08-11 PROCEDURE — 1126F PR PAIN SEVERITY QUANTIFIED, NO PAIN PRESENT: ICD-10-PCS | Mod: S$GLB,,, | Performed by: PSYCHIATRY & NEUROLOGY

## 2020-08-11 PROCEDURE — 99214 OFFICE O/P EST MOD 30 MIN: CPT | Mod: S$GLB,,, | Performed by: PSYCHIATRY & NEUROLOGY

## 2020-08-11 PROCEDURE — 1126F AMNT PAIN NOTED NONE PRSNT: CPT | Mod: S$GLB,,, | Performed by: PSYCHIATRY & NEUROLOGY

## 2020-08-11 PROCEDURE — 99214 PR OFFICE/OUTPT VISIT, EST, LEVL IV, 30-39 MIN: ICD-10-PCS | Mod: S$GLB,,, | Performed by: PSYCHIATRY & NEUROLOGY

## 2020-08-11 PROCEDURE — 1159F MED LIST DOCD IN RCRD: CPT | Mod: S$GLB,,, | Performed by: PSYCHIATRY & NEUROLOGY

## 2020-08-11 PROCEDURE — 99999 PR PBB SHADOW E&M-EST. PATIENT-LVL IV: ICD-10-PCS | Mod: PBBFAC,,, | Performed by: PSYCHIATRY & NEUROLOGY

## 2020-08-11 PROCEDURE — 1159F PR MEDICATION LIST DOCUMENTED IN MEDICAL RECORD: ICD-10-PCS | Mod: S$GLB,,, | Performed by: PSYCHIATRY & NEUROLOGY

## 2020-08-11 PROCEDURE — 1101F PR PT FALLS ASSESS DOC 0-1 FALLS W/OUT INJ PAST YR: ICD-10-PCS | Mod: CPTII,S$GLB,, | Performed by: PSYCHIATRY & NEUROLOGY

## 2020-08-11 PROCEDURE — 1101F PT FALLS ASSESS-DOCD LE1/YR: CPT | Mod: CPTII,S$GLB,, | Performed by: PSYCHIATRY & NEUROLOGY

## 2020-08-11 PROCEDURE — 99999 PR PBB SHADOW E&M-EST. PATIENT-LVL IV: CPT | Mod: PBBFAC,,, | Performed by: PSYCHIATRY & NEUROLOGY

## 2020-08-11 NOTE — PROGRESS NOTES
HPI:   Selma Miguel is a 85 y.o. female with HTN, DM, and dyslipidemia with not unexpected white matter changes on MRI brain done for headache which is right side locked and nightly (occurs while in bed). 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 Some arthritic changes in the neck / suspect pain is radiating from the neck.   Some falls  In 2015 with 6/2015 MRI C spine showing moderate to severe cervical spinal stenosis and Memory loss/ likely progressing to Alzheimer's dementia    Seroquel was ultimately increased to 1 at night and 1/2 at noon at this time (earlier dosing caused drowsiness)  She has no drowsiness now.   Trazodone still going  No hallucinations and no wandering  Sitters are being used M-F from 9-4pm and then family in the evenings and weekend and she does sleep alone in the house all night    No headaches and no cervical spine pain  Has some back pain which family treats with rest usually.    Memory continues to worsen  She is annoyed and frustrated at times but family redirects her.     Still on B12    Review of Systems   Unable to perform ROS: Dementia       Exam:  Gen Appearance, well developed/nourished in no apparent distress  CV: 2+ distal pulses with no edema or swelling  Neuro:  MS: Awake, alert,  Sustains attention.Not oriented fully to time or situation.  Recent recall is moderately forgetful/ remote memory intact, Language is full to spontaneous speech/comprehension. Fund of Knowledge is full.   Patient is a bit spontaneous, but easily redirected  CN:  PERRL, Extraoccular movements and visual fields are full. Normal facial  strength, Tongue and Palate are midline and strong. Shoulder Shrug symmetric and strong.  Motor: Normal bulk, tone, no abnormal movements. 5/5 strength bilateral upper/lower extremities with 1+ reflexes   Sensory: symmetric to  temp, and vibration, Romberg negative  Cerebellar:  Finger-nose, Rapid alternating movements intact  Gait: Normal stance, no ataxia with cane but chronic right feet deviation more than left post knee replacement. -- needs walker instead of cane again urged today but she refuses repeatedly except at home      Imaging: MRI C spine 6/2015 : Multilevel degenerative changes of the cervical spine contribute to moderate to severe central canal stenosis and moderate to severe neural foraminal narrowing as detailed in the above report.    Lack of a normal flow void in the left vertebral artery which could reflect slow flow or occlusion. Consider correlation with CTA as warranted.        Labs: 12/2015 TSh normal B12 low normal and patient was instructed on taking po b12      4/2016 MRI brain: Atrophy and scatter white matter disease reported  Also some mastoid changes    5/2017 CT head: No acute intracranial abnormalities.    Stable sequela of chronic microvascular ischemic change and senescent changes.    Soft tissue edema overlying the right frontal calvarium without underlying fracture.    Labs: 2019 CMP reviewed  2019 B12 well repleted    Assessment/Plan: Selma Miguel is a 87 y.o. female with HTN, DM, and dyslipidemia with not unexpected white matter changes on MRI brain done for headache which is right side locked and nightly (occurs while in bed). 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 Some arthritic changes in the neck / suspect pain is radiating from the neck.   Some falls  In 2015 with 6/2015 MRI C spine showing moderate to severe cervical spinal stenosis and Memory loss/ likely progressing to Alzheimer's dementia    I recommend:  1. Her moderate-severe cervical spinal stenosis could contribute to falls. She refused spine surgical consult prior-all asymptomatic again currently/ no longer having headache or and falls are not active  2. Noted is development of worsening memory  loss--progressive with time with unremarkable MRI brain in 2016. Continue B12 for low normal B12 levels (well repleted in 2019)  -Continue current dose Aricept and Namenda   3. Suggested time voiding for her bladder incontinence prior  4. She had a wandering spell in light of  UTI in 2019  -Recommend 24 hour supervision if hallucinations worsen again or if wandering. Tolerating staying alone at night  With sitters during the day  -hallucinations well controlled on Trazodone also for insomnia, which will continue instead of lexapro for mood  -however, she had some worsening depression, excessive worry, and insomnia: Seroquel 0.5 table in the am and 1 tablet at night is helping with hallucinations in addition to trazodone. Higher seroquel dosing caused sedation  Need to check fasting labs routinely with PCP on this med  5.  She also had some lack of sensation in the feet which suggests possible diabetic neuropathy as well (held on EMG given  patient's request-- she has no active symptoms now). She also has chronic orthopedic problem problems with the knees/ feet deviation-- this all also contributes to falls / fall precautions reviewed. PT helped  Prior.   6. Continue ASA started since vertebral a stenosis found (in addition to Plavix). Also on anti-HTN and statin for CVA prevention/plaque reduction (PCP follows labs). She also continues Prilosec for esogastitis (benefit for GERD greater than risk of reduced efficacy of plavix as she has never had a stroke)    RTC 6 months

## 2020-09-18 ENCOUNTER — CLINICAL SUPPORT (OUTPATIENT)
Dept: INTERNAL MEDICINE | Facility: CLINIC | Age: 85
End: 2020-09-18
Payer: MEDICARE

## 2020-09-18 DIAGNOSIS — F02.80 ALZHEIMER'S DEMENTIA WITHOUT BEHAVIORAL DISTURBANCE, UNSPECIFIED TIMING OF DEMENTIA ONSET: Primary | ICD-10-CM

## 2020-09-18 DIAGNOSIS — Z23 FLU VACCINE NEED: ICD-10-CM

## 2020-09-18 DIAGNOSIS — E11.65 UNCONTROLLED TYPE 2 DIABETES MELLITUS WITH HYPERGLYCEMIA: ICD-10-CM

## 2020-09-18 DIAGNOSIS — G30.9 ALZHEIMER'S DEMENTIA WITHOUT BEHAVIORAL DISTURBANCE, UNSPECIFIED TIMING OF DEMENTIA ONSET: Primary | ICD-10-CM

## 2020-09-18 LAB
ALBUMIN SERPL BCP-MCNC: 3.5 G/DL (ref 3.5–5.2)
ALBUMIN/CREAT UR: 19.7 UG/MG (ref 0–30)
ALP SERPL-CCNC: 65 U/L (ref 55–135)
ALT SERPL W/O P-5'-P-CCNC: 13 U/L (ref 10–44)
ANION GAP SERPL CALC-SCNC: 11 MMOL/L (ref 8–16)
AST SERPL-CCNC: 20 U/L (ref 10–40)
BACTERIA #/AREA URNS AUTO: ABNORMAL /HPF
BASOPHILS # BLD AUTO: 0.09 K/UL (ref 0–0.2)
BASOPHILS NFR BLD: 1.4 % (ref 0–1.9)
BILIRUB SERPL-MCNC: 0.5 MG/DL (ref 0.1–1)
BILIRUB UR QL STRIP: NEGATIVE
BUN SERPL-MCNC: 19 MG/DL (ref 8–23)
CALCIUM SERPL-MCNC: 9.3 MG/DL (ref 8.7–10.5)
CHLORIDE SERPL-SCNC: 101 MMOL/L (ref 95–110)
CHOLEST SERPL-MCNC: 158 MG/DL (ref 120–199)
CHOLEST/HDLC SERPL: 3.4 {RATIO} (ref 2–5)
CLARITY UR REFRACT.AUTO: ABNORMAL
CO2 SERPL-SCNC: 30 MMOL/L (ref 23–29)
COLOR UR AUTO: YELLOW
CREAT SERPL-MCNC: 1 MG/DL (ref 0.5–1.4)
CREAT UR-MCNC: 76 MG/DL (ref 15–325)
DIFFERENTIAL METHOD: ABNORMAL
EOSINOPHIL # BLD AUTO: 0.1 K/UL (ref 0–0.5)
EOSINOPHIL NFR BLD: 2.3 % (ref 0–8)
ERYTHROCYTE [DISTWIDTH] IN BLOOD BY AUTOMATED COUNT: 15.6 % (ref 11.5–14.5)
EST. GFR  (AFRICAN AMERICAN): 58.1 ML/MIN/1.73 M^2
EST. GFR  (NON AFRICAN AMERICAN): 50.4 ML/MIN/1.73 M^2
ESTIMATED AVG GLUCOSE: 174 MG/DL (ref 68–131)
GLUCOSE SERPL-MCNC: 162 MG/DL (ref 70–110)
GLUCOSE UR QL STRIP: NEGATIVE
HBA1C MFR BLD HPLC: 7.7 % (ref 4–5.6)
HCT VFR BLD AUTO: 49.5 % (ref 37–48.5)
HDLC SERPL-MCNC: 47 MG/DL (ref 40–75)
HDLC SERPL: 29.7 % (ref 20–50)
HGB BLD-MCNC: 15 G/DL (ref 12–16)
HGB UR QL STRIP: NEGATIVE
IMM GRANULOCYTES # BLD AUTO: 0.03 K/UL (ref 0–0.04)
IMM GRANULOCYTES NFR BLD AUTO: 0.5 % (ref 0–0.5)
KETONES UR QL STRIP: NEGATIVE
LDLC SERPL CALC-MCNC: 82.2 MG/DL (ref 63–159)
LEUKOCYTE ESTERASE UR QL STRIP: ABNORMAL
LYMPHOCYTES # BLD AUTO: 1.9 K/UL (ref 1–4.8)
LYMPHOCYTES NFR BLD: 30.7 % (ref 18–48)
MCH RBC QN AUTO: 28.7 PG (ref 27–31)
MCHC RBC AUTO-ENTMCNC: 30.3 G/DL (ref 32–36)
MCV RBC AUTO: 95 FL (ref 82–98)
MICROALBUMIN UR DL<=1MG/L-MCNC: 15 UG/ML
MICROSCOPIC COMMENT: ABNORMAL
MONOCYTES # BLD AUTO: 0.4 K/UL (ref 0.3–1)
MONOCYTES NFR BLD: 7.1 % (ref 4–15)
NEUTROPHILS # BLD AUTO: 3.6 K/UL (ref 1.8–7.7)
NEUTROPHILS NFR BLD: 58 % (ref 38–73)
NITRITE UR QL STRIP: NEGATIVE
NONHDLC SERPL-MCNC: 111 MG/DL
NRBC BLD-RTO: 0 /100 WBC
PH UR STRIP: 6 [PH] (ref 5–8)
PLATELET # BLD AUTO: 258 K/UL (ref 150–350)
PMV BLD AUTO: 12.6 FL (ref 9.2–12.9)
POTASSIUM SERPL-SCNC: 4.6 MMOL/L (ref 3.5–5.1)
PROT SERPL-MCNC: 6.5 G/DL (ref 6–8.4)
PROT UR QL STRIP: NEGATIVE
RBC # BLD AUTO: 5.23 M/UL (ref 4–5.4)
RBC #/AREA URNS AUTO: 1 /HPF (ref 0–4)
SODIUM SERPL-SCNC: 142 MMOL/L (ref 136–145)
SP GR UR STRIP: 1.01 (ref 1–1.03)
SQUAMOUS #/AREA URNS AUTO: 6 /HPF
TRIGL SERPL-MCNC: 144 MG/DL (ref 30–150)
TSH SERPL DL<=0.005 MIU/L-ACNC: 0.82 UIU/ML (ref 0.4–4)
URN SPEC COLLECT METH UR: ABNORMAL
WBC # BLD AUTO: 6.22 K/UL (ref 3.9–12.7)
WBC #/AREA URNS AUTO: 28 /HPF (ref 0–5)

## 2020-09-18 PROCEDURE — 80061 LIPID PANEL: CPT

## 2020-09-18 PROCEDURE — 80053 COMPREHEN METABOLIC PANEL: CPT

## 2020-09-18 PROCEDURE — 84443 ASSAY THYROID STIM HORMONE: CPT

## 2020-09-18 PROCEDURE — 90694 VACC AIIV4 NO PRSRV 0.5ML IM: CPT | Mod: S$GLB,,, | Performed by: NURSE PRACTITIONER

## 2020-09-18 PROCEDURE — G0008 PR ADMIN INFLUENZA VIRUS VAC: ICD-10-PCS | Mod: S$GLB,,, | Performed by: NURSE PRACTITIONER

## 2020-09-18 PROCEDURE — 83036 HEMOGLOBIN GLYCOSYLATED A1C: CPT

## 2020-09-18 PROCEDURE — 82043 UR ALBUMIN QUANTITATIVE: CPT

## 2020-09-18 PROCEDURE — G0008 ADMIN INFLUENZA VIRUS VAC: HCPCS | Mod: S$GLB,,, | Performed by: NURSE PRACTITIONER

## 2020-09-18 PROCEDURE — 87086 URINE CULTURE/COLONY COUNT: CPT

## 2020-09-18 PROCEDURE — 85025 COMPLETE CBC W/AUTO DIFF WBC: CPT

## 2020-09-18 PROCEDURE — 36415 COLL VENOUS BLD VENIPUNCTURE: CPT

## 2020-09-18 PROCEDURE — 81001 URINALYSIS AUTO W/SCOPE: CPT

## 2020-09-18 PROCEDURE — 90694 FLU VACCINE - QUADRIVALENT - ADJUVANTED: ICD-10-PCS | Mod: S$GLB,,, | Performed by: NURSE PRACTITIONER

## 2020-09-19 LAB — BACTERIA UR CULT: NORMAL

## 2020-09-28 ENCOUNTER — TELEPHONE (OUTPATIENT)
Dept: INTERNAL MEDICINE | Facility: CLINIC | Age: 85
End: 2020-09-28

## 2020-09-28 DIAGNOSIS — N39.0 URINARY TRACT INFECTION WITHOUT HEMATURIA, SITE UNSPECIFIED: Primary | ICD-10-CM

## 2020-09-28 RX ORDER — CIPROFLOXACIN 250 MG/1
250 TABLET, FILM COATED ORAL EVERY 12 HOURS
Qty: 14 TABLET | Refills: 0 | Status: SHIPPED | OUTPATIENT
Start: 2020-09-28 | End: 2020-10-05

## 2021-01-12 ENCOUNTER — IMMUNIZATION (OUTPATIENT)
Dept: FAMILY MEDICINE | Facility: CLINIC | Age: 86
End: 2021-01-12
Payer: MEDICARE

## 2021-01-12 DIAGNOSIS — Z23 NEED FOR VACCINATION: ICD-10-CM

## 2021-01-12 PROCEDURE — 91300 COVID-19, MRNA, LNP-S, PF, 30 MCG/0.3 ML DOSE VACCINE: CPT | Mod: PBBFAC

## 2021-01-20 RX ORDER — METFORMIN HYDROCHLORIDE 500 MG/1
1000 TABLET ORAL NIGHTLY
Qty: 90 TABLET | Refills: 1 | Status: SHIPPED | OUTPATIENT
Start: 2021-01-20 | End: 2021-04-15

## 2021-01-27 ENCOUNTER — TELEPHONE (OUTPATIENT)
Dept: INTERNAL MEDICINE | Facility: CLINIC | Age: 86
End: 2021-01-27

## 2021-02-02 ENCOUNTER — IMMUNIZATION (OUTPATIENT)
Dept: FAMILY MEDICINE | Facility: CLINIC | Age: 86
End: 2021-02-02
Payer: MEDICARE

## 2021-02-02 ENCOUNTER — OFFICE VISIT (OUTPATIENT)
Dept: INTERNAL MEDICINE | Facility: CLINIC | Age: 86
End: 2021-02-02
Payer: MEDICARE

## 2021-02-02 ENCOUNTER — TELEPHONE (OUTPATIENT)
Dept: INTERNAL MEDICINE | Facility: CLINIC | Age: 86
End: 2021-02-02

## 2021-02-02 VITALS
HEIGHT: 67 IN | BODY MASS INDEX: 33.04 KG/M2 | DIASTOLIC BLOOD PRESSURE: 74 MMHG | SYSTOLIC BLOOD PRESSURE: 110 MMHG | HEART RATE: 86 BPM | RESPIRATION RATE: 18 BRPM | OXYGEN SATURATION: 98 %

## 2021-02-02 DIAGNOSIS — S81.802A WOUND OF LEFT LOWER EXTREMITY, INITIAL ENCOUNTER: ICD-10-CM

## 2021-02-02 DIAGNOSIS — G30.9 ALZHEIMER'S DEMENTIA WITHOUT BEHAVIORAL DISTURBANCE, UNSPECIFIED TIMING OF DEMENTIA ONSET: Primary | ICD-10-CM

## 2021-02-02 DIAGNOSIS — S81.812A SKIN TEAR OF LEFT LOWER LEG WITHOUT COMPLICATION, INITIAL ENCOUNTER: ICD-10-CM

## 2021-02-02 DIAGNOSIS — F02.80 ALZHEIMER'S DEMENTIA WITHOUT BEHAVIORAL DISTURBANCE, UNSPECIFIED TIMING OF DEMENTIA ONSET: Primary | ICD-10-CM

## 2021-02-02 DIAGNOSIS — S81.802A WOUND OF LEFT LOWER EXTREMITY, INITIAL ENCOUNTER: Primary | ICD-10-CM

## 2021-02-02 DIAGNOSIS — Z23 NEED FOR VACCINATION: Primary | ICD-10-CM

## 2021-02-02 PROCEDURE — 1126F PR PAIN SEVERITY QUANTIFIED, NO PAIN PRESENT: ICD-10-PCS | Mod: S$GLB,,, | Performed by: NURSE PRACTITIONER

## 2021-02-02 PROCEDURE — 99999 PR PBB SHADOW E&M-EST. PATIENT-LVL IV: CPT | Mod: PBBFAC,,, | Performed by: NURSE PRACTITIONER

## 2021-02-02 PROCEDURE — 99999 PR PBB SHADOW E&M-EST. PATIENT-LVL IV: ICD-10-PCS | Mod: PBBFAC,,, | Performed by: NURSE PRACTITIONER

## 2021-02-02 PROCEDURE — 1126F AMNT PAIN NOTED NONE PRSNT: CPT | Mod: S$GLB,,, | Performed by: NURSE PRACTITIONER

## 2021-02-02 PROCEDURE — 1159F MED LIST DOCD IN RCRD: CPT | Mod: S$GLB,,, | Performed by: NURSE PRACTITIONER

## 2021-02-02 PROCEDURE — 0002A COVID-19, MRNA, LNP-S, PF, 30 MCG/0.3 ML DOSE VACCINE: CPT | Mod: PBBFAC | Performed by: FAMILY MEDICINE

## 2021-02-02 PROCEDURE — 99213 OFFICE O/P EST LOW 20 MIN: CPT | Mod: S$GLB,,, | Performed by: NURSE PRACTITIONER

## 2021-02-02 PROCEDURE — 91300 COVID-19, MRNA, LNP-S, PF, 30 MCG/0.3 ML DOSE VACCINE: CPT | Mod: PBBFAC | Performed by: FAMILY MEDICINE

## 2021-02-02 PROCEDURE — 1159F PR MEDICATION LIST DOCUMENTED IN MEDICAL RECORD: ICD-10-PCS | Mod: S$GLB,,, | Performed by: NURSE PRACTITIONER

## 2021-02-02 PROCEDURE — 99213 PR OFFICE/OUTPT VISIT, EST, LEVL III, 20-29 MIN: ICD-10-PCS | Mod: S$GLB,,, | Performed by: NURSE PRACTITIONER

## 2021-02-02 RX ORDER — ISOPROPYL ALCOHOL 70 ML/100ML
SWAB TOPICAL
COMMUNITY

## 2021-02-02 RX ORDER — COLLAGENASE SANTYL 250 [ARB'U]/G
OINTMENT TOPICAL DAILY
Qty: 30 G | Refills: 1 | Status: SHIPPED | OUTPATIENT
Start: 2021-02-02 | End: 2021-02-02 | Stop reason: SDUPTHER

## 2021-02-02 RX ORDER — COLLAGENASE SANTYL 250 [ARB'U]/G
OINTMENT TOPICAL DAILY
Qty: 30 G | Refills: 1
Start: 2021-02-02

## 2021-03-02 ENCOUNTER — OFFICE VISIT (OUTPATIENT)
Dept: NEUROLOGY | Facility: CLINIC | Age: 86
End: 2021-03-02
Payer: MEDICARE

## 2021-03-02 VITALS
TEMPERATURE: 97 F | WEIGHT: 215.38 LBS | DIASTOLIC BLOOD PRESSURE: 88 MMHG | HEIGHT: 67 IN | BODY MASS INDEX: 33.8 KG/M2 | HEART RATE: 98 BPM | RESPIRATION RATE: 16 BRPM | SYSTOLIC BLOOD PRESSURE: 136 MMHG

## 2021-03-02 DIAGNOSIS — G30.9 ALZHEIMER'S DEMENTIA WITHOUT BEHAVIORAL DISTURBANCE, UNSPECIFIED TIMING OF DEMENTIA ONSET: Primary | ICD-10-CM

## 2021-03-02 DIAGNOSIS — M48.02 CERVICAL SPINAL STENOSIS: ICD-10-CM

## 2021-03-02 DIAGNOSIS — F02.80 ALZHEIMER'S DEMENTIA WITHOUT BEHAVIORAL DISTURBANCE, UNSPECIFIED TIMING OF DEMENTIA ONSET: Primary | ICD-10-CM

## 2021-03-02 DIAGNOSIS — E53.8 B12 DEFICIENCY: ICD-10-CM

## 2021-03-02 PROCEDURE — 1159F MED LIST DOCD IN RCRD: CPT | Mod: S$GLB,,, | Performed by: PSYCHIATRY & NEUROLOGY

## 2021-03-02 PROCEDURE — 1126F AMNT PAIN NOTED NONE PRSNT: CPT | Mod: S$GLB,,, | Performed by: PSYCHIATRY & NEUROLOGY

## 2021-03-02 PROCEDURE — 99999 PR PBB SHADOW E&M-EST. PATIENT-LVL IV: ICD-10-PCS | Mod: PBBFAC,,, | Performed by: PSYCHIATRY & NEUROLOGY

## 2021-03-02 PROCEDURE — 1126F PR PAIN SEVERITY QUANTIFIED, NO PAIN PRESENT: ICD-10-PCS | Mod: S$GLB,,, | Performed by: PSYCHIATRY & NEUROLOGY

## 2021-03-02 PROCEDURE — 99999 PR PBB SHADOW E&M-EST. PATIENT-LVL IV: CPT | Mod: PBBFAC,,, | Performed by: PSYCHIATRY & NEUROLOGY

## 2021-03-02 PROCEDURE — 99214 PR OFFICE/OUTPT VISIT, EST, LEVL IV, 30-39 MIN: ICD-10-PCS | Mod: S$GLB,,, | Performed by: PSYCHIATRY & NEUROLOGY

## 2021-03-02 PROCEDURE — 1159F PR MEDICATION LIST DOCUMENTED IN MEDICAL RECORD: ICD-10-PCS | Mod: S$GLB,,, | Performed by: PSYCHIATRY & NEUROLOGY

## 2021-03-02 PROCEDURE — 99214 OFFICE O/P EST MOD 30 MIN: CPT | Mod: S$GLB,,, | Performed by: PSYCHIATRY & NEUROLOGY

## 2021-04-15 RX ORDER — METFORMIN HYDROCHLORIDE 500 MG/1
TABLET ORAL
Qty: 90 TABLET | Refills: 1 | Status: SHIPPED | OUTPATIENT
Start: 2021-04-15

## 2021-05-24 DIAGNOSIS — I10 ESSENTIAL (PRIMARY) HYPERTENSION: ICD-10-CM

## 2021-05-25 RX ORDER — MEMANTINE HYDROCHLORIDE 28 MG/1
CAPSULE, EXTENDED RELEASE ORAL
Qty: 30 CAPSULE | Refills: 5 | Status: SHIPPED | OUTPATIENT
Start: 2021-05-25

## 2021-05-25 RX ORDER — ATORVASTATIN CALCIUM 10 MG/1
TABLET, FILM COATED ORAL
Qty: 30 TABLET | Refills: 5 | Status: SHIPPED | OUTPATIENT
Start: 2021-05-25

## 2021-05-25 RX ORDER — LOSARTAN POTASSIUM 50 MG/1
TABLET ORAL
Qty: 30 TABLET | Refills: 5 | Status: SHIPPED | OUTPATIENT
Start: 2021-05-25

## 2021-05-25 RX ORDER — CLOPIDOGREL BISULFATE 75 MG/1
TABLET ORAL
Qty: 30 TABLET | Refills: 5 | Status: SHIPPED | OUTPATIENT
Start: 2021-05-25

## 2021-07-24 PROCEDURE — 82272 OCCULT BLD FECES 1-3 TESTS: CPT | Performed by: HOSPITALIST

## 2021-07-25 ENCOUNTER — LAB VISIT (OUTPATIENT)
Dept: LAB | Facility: HOSPITAL | Age: 86
End: 2021-07-25
Attending: HOSPITALIST
Payer: MEDICARE

## 2021-07-25 DIAGNOSIS — R19.7 DIARRHEA: Primary | ICD-10-CM

## 2021-07-25 LAB — OB PNL STL: NEGATIVE

## 2021-12-23 ENCOUNTER — HOSPITAL ENCOUNTER (EMERGENCY)
Facility: HOSPITAL | Age: 86
Discharge: HOME | End: 2021-12-23
Attending: STUDENT IN AN ORGANIZED HEALTH CARE EDUCATION/TRAINING PROGRAM
Payer: MEDICARE

## 2021-12-23 VITALS
BODY MASS INDEX: 32.49 KG/M2 | HEART RATE: 90 BPM | OXYGEN SATURATION: 93 % | SYSTOLIC BLOOD PRESSURE: 165 MMHG | RESPIRATION RATE: 20 BRPM | WEIGHT: 207 LBS | DIASTOLIC BLOOD PRESSURE: 84 MMHG | TEMPERATURE: 96 F | HEIGHT: 67 IN

## 2021-12-23 DIAGNOSIS — R41.82 AMS (ALTERED MENTAL STATUS): ICD-10-CM

## 2021-12-23 LAB
ALBUMIN SERPL BCP-MCNC: 2.9 G/DL (ref 3.5–5.2)
ALP SERPL-CCNC: 60 U/L (ref 55–135)
ALT SERPL W/O P-5'-P-CCNC: 13 U/L (ref 10–44)
AMMONIA PLAS-SCNC: 15 UMOL/L (ref 10–50)
AMPHET+METHAMPHET UR QL: ABNORMAL
ANION GAP SERPL CALC-SCNC: 9 MMOL/L (ref 8–16)
AST SERPL-CCNC: 20 U/L (ref 10–40)
BARBITURATES UR QL SCN>200 NG/ML: NEGATIVE
BASOPHILS # BLD AUTO: 0.08 K/UL (ref 0–0.2)
BASOPHILS NFR BLD: 0.6 % (ref 0–1.9)
BENZODIAZ UR QL SCN>200 NG/ML: NEGATIVE
BILIRUB SERPL-MCNC: 0.6 MG/DL (ref 0.1–1)
BILIRUB UR QL STRIP: NEGATIVE
BUN SERPL-MCNC: 14 MG/DL (ref 8–23)
BZE UR QL SCN: NEGATIVE
CALCIUM SERPL-MCNC: 9.1 MG/DL (ref 8.7–10.5)
CANNABINOIDS UR QL SCN: NEGATIVE
CHLORIDE SERPL-SCNC: 104 MMOL/L (ref 95–110)
CLARITY UR: CLEAR
CO2 SERPL-SCNC: 29 MMOL/L (ref 23–29)
COLOR UR: YELLOW
CREAT SERPL-MCNC: 0.8 MG/DL (ref 0.5–1.4)
CREAT UR-MCNC: 141.1 MG/DL (ref 15–325)
DIFFERENTIAL METHOD: ABNORMAL
EOSINOPHIL # BLD AUTO: 0.1 K/UL (ref 0–0.5)
EOSINOPHIL NFR BLD: 0.8 % (ref 0–8)
ERYTHROCYTE [DISTWIDTH] IN BLOOD BY AUTOMATED COUNT: 14.6 % (ref 11.5–14.5)
EST. GFR  (AFRICAN AMERICAN): >60 ML/MIN/1.73 M^2
EST. GFR  (NON AFRICAN AMERICAN): >60 ML/MIN/1.73 M^2
GLUCOSE SERPL-MCNC: 155 MG/DL (ref 70–110)
GLUCOSE UR QL STRIP: NEGATIVE
HCT VFR BLD AUTO: 40.9 % (ref 37–48.5)
HGB BLD-MCNC: 13.1 G/DL (ref 12–16)
HGB UR QL STRIP: NEGATIVE
IMM GRANULOCYTES # BLD AUTO: 0.06 K/UL (ref 0–0.04)
IMM GRANULOCYTES NFR BLD AUTO: 0.5 % (ref 0–0.5)
KETONES UR QL STRIP: NEGATIVE
LEUKOCYTE ESTERASE UR QL STRIP: NEGATIVE
LYMPHOCYTES # BLD AUTO: 2.1 K/UL (ref 1–4.8)
LYMPHOCYTES NFR BLD: 17.1 % (ref 18–48)
MCH RBC QN AUTO: 29.7 PG (ref 27–31)
MCHC RBC AUTO-ENTMCNC: 32 G/DL (ref 32–36)
MCV RBC AUTO: 93 FL (ref 82–98)
METHADONE UR QL SCN>300 NG/ML: NEGATIVE
MONOCYTES # BLD AUTO: 0.7 K/UL (ref 0.3–1)
MONOCYTES NFR BLD: 5.8 % (ref 4–15)
NEUTROPHILS # BLD AUTO: 9.4 K/UL (ref 1.8–7.7)
NEUTROPHILS NFR BLD: 75.2 % (ref 38–73)
NITRITE UR QL STRIP: NEGATIVE
NRBC BLD-RTO: 0 /100 WBC
OPIATES UR QL SCN: NEGATIVE
PCP UR QL SCN>25 NG/ML: NEGATIVE
PH UR STRIP: 6 [PH] (ref 5–8)
PLATELET # BLD AUTO: 278 K/UL (ref 150–450)
PMV BLD AUTO: 11.1 FL (ref 9.2–12.9)
POCT GLUCOSE: 186 MG/DL (ref 70–110)
POTASSIUM SERPL-SCNC: 3.7 MMOL/L (ref 3.5–5.1)
PROT SERPL-MCNC: 5.9 G/DL (ref 6–8.4)
PROT UR QL STRIP: NEGATIVE
RBC # BLD AUTO: 4.41 M/UL (ref 4–5.4)
SODIUM SERPL-SCNC: 142 MMOL/L (ref 136–145)
SP GR UR STRIP: >=1.03 (ref 1–1.03)
TOXICOLOGY INFORMATION: ABNORMAL
URN SPEC COLLECT METH UR: ABNORMAL
UROBILINOGEN UR STRIP-ACNC: NEGATIVE EU/DL
WBC # BLD AUTO: 12.43 K/UL (ref 3.9–12.7)

## 2021-12-23 PROCEDURE — 82962 GLUCOSE BLOOD TEST: CPT

## 2021-12-23 PROCEDURE — 93010 EKG 12-LEAD: ICD-10-PCS | Mod: ,,, | Performed by: INTERNAL MEDICINE

## 2021-12-23 PROCEDURE — 99285 EMERGENCY DEPT VISIT HI MDM: CPT | Mod: 25

## 2021-12-23 PROCEDURE — 85025 COMPLETE CBC W/AUTO DIFF WBC: CPT | Performed by: STUDENT IN AN ORGANIZED HEALTH CARE EDUCATION/TRAINING PROGRAM

## 2021-12-23 PROCEDURE — 82140 ASSAY OF AMMONIA: CPT | Performed by: STUDENT IN AN ORGANIZED HEALTH CARE EDUCATION/TRAINING PROGRAM

## 2021-12-23 PROCEDURE — 36415 COLL VENOUS BLD VENIPUNCTURE: CPT | Performed by: STUDENT IN AN ORGANIZED HEALTH CARE EDUCATION/TRAINING PROGRAM

## 2021-12-23 PROCEDURE — 80053 COMPREHEN METABOLIC PANEL: CPT | Performed by: STUDENT IN AN ORGANIZED HEALTH CARE EDUCATION/TRAINING PROGRAM

## 2021-12-23 PROCEDURE — 93005 ELECTROCARDIOGRAM TRACING: CPT

## 2021-12-23 PROCEDURE — 80307 DRUG TEST PRSMV CHEM ANLYZR: CPT | Performed by: STUDENT IN AN ORGANIZED HEALTH CARE EDUCATION/TRAINING PROGRAM

## 2021-12-23 PROCEDURE — 81003 URINALYSIS AUTO W/O SCOPE: CPT | Mod: 59 | Performed by: STUDENT IN AN ORGANIZED HEALTH CARE EDUCATION/TRAINING PROGRAM

## 2021-12-23 PROCEDURE — 93010 ELECTROCARDIOGRAM REPORT: CPT | Mod: ,,, | Performed by: INTERNAL MEDICINE

## 2021-12-27 ENCOUNTER — TELEPHONE (OUTPATIENT)
Dept: INTERNAL MEDICINE | Facility: CLINIC | Age: 86
End: 2021-12-27
Payer: MEDICARE

## 2021-12-27 DIAGNOSIS — I63.9 CEREBROVASCULAR ACCIDENT (CVA), UNSPECIFIED MECHANISM: Primary | ICD-10-CM

## 2021-12-29 NOTE — ED PROVIDER NOTES
"[facility]                                                  Chief Complaint     Chief Complaint   Patient presents with    Altered Mental Status     Family states that she's not in her normal self but staff in NH noted, "This is her baseline mentation."       History of Present Illness  89 y.o. female with past medical history of anxiety, diabetes mellitus type II, hyperlipidemia, hypertension and CVA who presents with altered mental status since yesterday at 12pm. Per daughters, patient began leaking food out off her mouth yesterday morning. Today, at 12:30 on, her daughters noticed that she was becoming less responsive than normal. Additionally, daughters say that they noticed a R sided facial droop at 12:30 pm.     History obtained from:  Daughters    Review of patient's allergies indicates:   Allergen Reactions    Morphine      Past Medical History:   Diagnosis Date    Anxiety     Diabetes mellitus type II, controlled     Dizziness     Hyperlipidemia     Hypertension     Stroke      Past Surgical History:   Procedure Laterality Date    BREAST LUMPECTOMY      TOTAL KNEE ARTHROPLASTY        No family history on file.  Social History     Tobacco Use    Smoking status: Former Smoker     Packs/day: 1.00     Years: 10.00     Pack years: 10.00     Types: Cigarettes     Quit date: 1961     Years since quittin.0    Smokeless tobacco: Never Used   Substance Use Topics    Alcohol use: No    Drug use: No     Review of Systems and Physical Exam     Review of Systems  --Constitutional - Denies fever, appetite change, chills  --Eyes - Denies eye discharge, eye pain, eye redness or visual disturbance  --HENT- Denies congestion, sore throat, drooling, ear discharge, rhinorrhea or trouble swallowing  --Respiratory - Denies cough, shortness or breath, wheezing  --Cardiovascular - Denies chest pain, leg swelling, palpitations  --Gastrointestinal - Denies abdominal pain, abdominal distension, " "constipation, diarrhea, nausea or vomiting  --Genitourinary - Denies difficulty urinating, dysuria, hematuria, urgency  --Musculoskeletal - Denies arthralgias, myalgias  --Neurological - + Denies dizziness, headaches, lightheadedness, weakness  --Hematologic - Denies easy bruising or easy bleeding  --Skin - Denies rash, wound or pallor  --Psychiatric- Denies dysphoric mood, nervousness/anxiety    Vital Signs   height is 5' 7" (1.702 m) and weight is 93.9 kg (207 lb). Her temperature is 96.1 °F (35.6 °C). Her blood pressure is 165/84 (abnormal) and her pulse is 90. Her respiration is 20 and oxygen saturation is 93% (abnormal).      ``Physical Exam   Nursing note and vitals reviewed  --Constitutional:  Well developed, well nourished  --HENT: Normocephalic, atraumatic. No rhinorrhea. Moist oral mucosa. No oropharyngeal edema, erythema or exudates.   --Eyes: PERRL. Extraocular movements intact. No periorbital swelling. Normal conjunctiva.  --Neck: Normal range of motion. Neck supple. No adenopathy  --Cardiac: Regular rhythm, normal S1, normal S2, no murmur, normal rate, intact distal pulses  --Pulmonary: Normal respiratory effort, breath sounds normal and equal bilaterally, no accessory muscle use, no respiratory distress  --Abdominal: Soft, normal bowel sounds, no tenderness, no guarding, no rebound  --Musculoskeletal: Normal range of motion. No deformity, tenderness or edema.  --Neurological:  Alert and oriented to self only. No facial droop appreciated. 4/5 motor strength in BUE and BLE. Normal sensation to light touch grossly.  --Skin: Warm and dry. No rash, pallor, cyanosis or jaundice.  --Psych: Normal mood    ED Course   Lab Results (reviewed by the physician)  Labs Reviewed   CBC W/ AUTO DIFFERENTIAL - Abnormal; Notable for the following components:       Result Value    RDW 14.6 (*)     Gran # (ANC) 9.4 (*)     Immature Grans (Abs) 0.06 (*)     Gran % 75.2 (*)     Lymph % 17.1 (*)     All other components " within normal limits   COMPREHENSIVE METABOLIC PANEL - Abnormal; Notable for the following components:    Glucose 155 (*)     Total Protein 5.9 (*)     Albumin 2.9 (*)     All other components within normal limits   URINALYSIS, REFLEX TO URINE CULTURE - Abnormal; Notable for the following components:    Specific Gravity, UA >=1.030 (*)     All other components within normal limits    Narrative:     Specimen Source->Urine   DRUG SCREEN PANEL, URINE EMERGENCY - Abnormal; Notable for the following components:    Amphetamine Screen, Ur Presumptive Positive (*)     All other components within normal limits    Narrative:     Specimen Source->Urine   POCT GLUCOSE - Abnormal; Notable for the following components:    POCT Glucose 186 (*)     All other components within normal limits   AMMONIA       EKG (interpreted by the physician)  Rate: 88 bpm  Rhythm: Sinus Rhythm  Axis: Normal  QTc interval: 484 ms  ST-segments: No elevation or depression,  Overall Interpretation: Sinus rhythm with premature atrial complexes    Similar when compared to previous EKG    Radiology (images visualized & reports reviewed by the physician)  CT Head Without Contrast   Final Result      Moderate brain atrophy with deep white matter ischemic changes.  Otherwise negative head CT.         Electronically signed by: Josep Wise MD   Date:    12/23/2021   Time:    15:50      X-Ray Chest 1 View   Final Result      Hypoventilation.  Atherosclerosis.  Otherwise negative chest x-ray         Electronically signed by: Josep Wise MD   Date:    12/23/2021   Time:    15:51          Medications Given  Medications - No data to display    Differential Diagnosis:  CVA, subdural hemorrhage, hypoglycemia, ACS, hypothyroidism, hypokalemia, electrolyte abnormallity    Clinical Tests:  Lab Tests: Ordered and reviewed  Radiological Study: Ordered and reviewed  Medical Tests: Ordered and reviewed    ED Management  Vitals stable.  Lab workup unremarkable with  normal glucose. Considering reported facial droop and altered mental status with difficulty following commands and aphasia, stroke activation was called. CT-revealed  No evidence of acute CVA.  Patient was evaluated by Dr. Bright (Neurologist) who stated that there was no need for admission for further stroke work-up considering that patient is out of the tPA and neuro endovascular intervention window, as her symptoms have been presents for 48 hours. She was discharged to her nursing home with instructions to return if she has any worsening of her symptoms. Her daughters expressed understanding of the plan and questions were answered.       Diagnosis  Diagnoses of AMS (altered mental status) and AMS (altered mental status) were pertinent to this visit.    Disposition and Plan  Condition: Stable  Disposition: Discharge  Follow-up: Patient to follow up with Montrell Trujillo MD in 1-2 days, and any specialists noted on discharge paperwork. Patient was deemed stable for discharge. Strict return precautions given. Understanding of the plan was expressed and all questions were answered.    ED Prescriptions     None        Follow-up Information     Follow up With Specialties Details Why Contact Info    [facility] - Emergency Dept Emergency Medicine Go to  As needed, If symptoms worsen [address] [facility] [address]  [phone]    Montrell Trujillo MD Family Medicine Schedule an appointment as soon as possible for a visit in 3 days For follow-up of your ED visit [address] [facility]  [place] [address]  [phone]             Denilson Neal MD  12/30/21 0009

## 2022-01-31 ENCOUNTER — OFFICE VISIT (OUTPATIENT)
Dept: WOUND CARE | Facility: HOSPITAL | Age: 87
End: 2022-01-31
Attending: SURGERY
Payer: MEDICARE

## 2022-01-31 VITALS
DIASTOLIC BLOOD PRESSURE: 46 MMHG | TEMPERATURE: 97 F | RESPIRATION RATE: 17 BRPM | SYSTOLIC BLOOD PRESSURE: 92 MMHG | HEART RATE: 75 BPM

## 2022-01-31 DIAGNOSIS — F02.80 ALZHEIMER'S DEMENTIA WITHOUT BEHAVIORAL DISTURBANCE, UNSPECIFIED TIMING OF DEMENTIA ONSET: Primary | ICD-10-CM

## 2022-01-31 DIAGNOSIS — L89.312 PRESSURE INJURY OF RIGHT BUTTOCK, STAGE 2: ICD-10-CM

## 2022-01-31 DIAGNOSIS — L97.514 NON-PRESSURE CHRONIC ULCER OF OTHER PART OF RIGHT FOOT WITH NECROSIS OF BONE: ICD-10-CM

## 2022-01-31 DIAGNOSIS — G30.9 ALZHEIMER'S DEMENTIA WITHOUT BEHAVIORAL DISTURBANCE, UNSPECIFIED TIMING OF DEMENTIA ONSET: Primary | ICD-10-CM

## 2022-01-31 DIAGNOSIS — I70.201 ATHEROSCLEROSIS OF NATIVE ARTERY OF RIGHT LOWER EXTREMITY, WITH UNSPECIFIED PRESENCE OF CLINICAL MANIFESTATION: ICD-10-CM

## 2022-01-31 DIAGNOSIS — E43 SEVERE MALNUTRITION: ICD-10-CM

## 2022-01-31 DIAGNOSIS — L89.320 PRESSURE INJURY OF LEFT BUTTOCK, UNSTAGEABLE: ICD-10-CM

## 2022-01-31 DIAGNOSIS — Z74.09 IMMOBILITY: ICD-10-CM

## 2022-01-31 PROCEDURE — 97597 DBRDMT OPN WND 1ST 20 CM/<: CPT

## 2022-01-31 PROCEDURE — 99499 UNLISTED E&M SERVICE: CPT | Mod: ,,, | Performed by: SURGERY

## 2022-01-31 PROCEDURE — 99214 OFFICE O/P EST MOD 30 MIN: CPT | Mod: 25

## 2022-01-31 PROCEDURE — 99499 NO LOS: ICD-10-PCS | Mod: ,,, | Performed by: SURGERY

## 2022-01-31 NOTE — ASSESSMENT & PLAN NOTE
Patient has Alzheimer's dementia with progressive worsening.  She has been bed-bound for the past month.  She has had significant decrease in her food intake.  She has lost 60 lb since May of last year.  According to the daughter, she often refuses food.  Given her overall condition and multiple problems, I have recommended hospice care.  This was discussed in detail with the patient's daughter.

## 2022-01-31 NOTE — H&P
[facility]  Wound Care  History and Physical    Problem List Items Addressed This Visit        Neuro    Alzheimer's dementia - Primary    Current Assessment & Plan     Patient has Alzheimer's dementia with progressive worsening.  She has been bed-bound for the past month.  She has had significant decrease in her food intake.  She has lost 60 lb since May of last year.  According to the daughter, she often refuses food.  Given her overall condition and multiple problems, I have recommended hospice care.  This was discussed in detail with the patient's daughter.            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  The patient has Alzheimer's to mention she is unable to provide any history.  The patient moans frequently complaining of pain and asking for help.  She complained of pain in the right foot which led to noticing the wound.  The daughter indicated she found purulent drainage around all the toes.  Patient has no fever or chills.         Current Assessment & Plan     Examination of the wound revealed protrusion of bone through the skin.  The interphalangeal joint was open at the site of the wound.  There is no significant purulence noted.  Based on the patient's current condition of dementia and overall debility with associated poor nutrition, conservative therapy will be undertaken with alcohol and Betadine daily.  The family will proceed with hospice care.            Endocrine    Severe malnutrition    Overview     She has had significant decrease in her food intake.  She has lost 60 lb since May of last year.  According to the daughter, she often refuses food.           Current Assessment & Plan     Patient's family does not wish to pursue a feeding tube.  Nutrition  is expected to only worsen as the patient is refusing eating.  Patient's family is likely to pursue hospice.            Other    Pressure injury of right buttock, stage 2    Overview     Patient noted to have a wounds to the buttocks last week by the daughter.  Patient does apparently complain of some discomfort associated with buttock wounds.  There is not been any significant drainage.         Current Assessment & Plan     Barrier cream daily.  Avoid pressure with frequent rotation on sides.         Pressure injury of left buttock, unstageable    Overview     Patient noted to have a wounds to the buttocks last week by the daughter.  Patient does apparently complain of some discomfort associated with buttock wounds.  There is not been any significant drainage.           Current Assessment & Plan     Foam dressing Monday Wednesday Friday and as needed.  Offload with frequent turning on sides.         Immobility            History:    Past Medical History:   Diagnosis Date    Anxiety     Diabetes mellitus type II, controlled     Dizziness     Hyperlipidemia     Hypertension     Stroke        Past Surgical History:   Procedure Laterality Date    BREAST LUMPECTOMY      TOTAL KNEE ARTHROPLASTY         No family history on file.     reports that she quit smoking about 61 years ago. Her smoking use included cigarettes. She has a 10.00 pack-year smoking history. She has never used smokeless tobacco. She reports that she does not drink alcohol and does not use drugs.    has a current medication list which includes the following prescription(s): alcohol swabs, aspirin, atorvastatin, clopidogrel, santyl, cyanocobalamin, donepezil, losartan, memantine, metformin, multivitamin, pyridoxine (vitamin b6), quetiapine, trazodone, and vitamin d.    Allergies:  Morphine    Review of Systems:  Review of Systems   Unable to perform ROS: Dementia   All history was obtained from the patient's daughter      Vitals:    01/31/22 1603    BP: (!) 92/46   Pulse: 75   Resp: 17   Temp: 97 °F (36.1 °C)         BMI:  There is no height or weight on file to calculate BMI.    Physical Exam:  Physical Exam  Vitals (Blood pressure noted to be in the 90 systolic) reviewed.   Constitutional:       General: She is in acute distress.      Appearance: She is ill-appearing.   HENT:      Head: Atraumatic.      Comments: Sunken eyes and super temporal wasting     Mouth/Throat:      Mouth: Mucous membranes are moist.   Eyes:      General: No scleral icterus.     Extraocular Movements: Extraocular movements intact.      Pupils: Pupils are equal, round, and reactive to light.   Cardiovascular:      Rate and Rhythm: Normal rate and regular rhythm.      Comments: Patient has no palpable pulses in the right or left foot.  Pulmonary:      Effort: Pulmonary effort is normal.      Breath sounds: Normal breath sounds.   Abdominal:      General: Abdomen is flat. There is no distension.      Palpations: Abdomen is soft.   Musculoskeletal:      Cervical back: Neck supple. No rigidity.      Comments: See wound progress note for detailed wound description.     Lymphadenopathy:      Cervical: No cervical adenopathy.   Skin:     Comments: Feet are pale and somewhat cool.  Poor capillary refill of the feet.   Neurological:      Mental Status: She is disoriented.      Comments: Bed-bound   Psychiatric:      Comments: Patient moans frequently.         A1C:  No results for input(s): HGBA1C in the last 2160 hours.  BMP:  Recent Labs   Lab Result Units 12/23/21  1526   Glucose mg/dL 155*   Sodium mmol/L 142   Potassium mmol/L 3.7   Chloride mmol/L 104   CO2 mmol/L 29   BUN mg/dL 14   Creatinine mg/dL 0.8   Calcium mg/dL 9.1      CBC:  Recent Labs   Lab Result Units 12/23/21  1526   WBC K/uL 12.43   RBC M/uL 4.41   Hemoglobin g/dL 13.1   Hematocrit % 40.9   Platelets K/uL 278   MCV fL 93   MCH pg 29.7   MCHC g/dL 32.0     CMP:  Recent Labs   Lab Result Units 12/23/21  1526   Glucose mg/dL  155*   Calcium mg/dL 9.1   Albumin g/dL 2.9*   Total Protein g/dL 5.9*   Sodium mmol/L 142   Potassium mmol/L 3.7   CO2 mmol/L 29   Chloride mmol/L 104   BUN mg/dL 14   Alkaline Phosphatase U/L 60   ALT U/L 13   AST U/L 20   Total Bilirubin mg/dL 0.6     PREALBUMIN:  No results for input(s): PREALBUMIN in the last 2160 hours.  WOUND CULTURES:  No results for input(s): LABAERO in the last 2160 hours.        Plan:  See Wound Docs note for plan and follow up.        Bonifacio BRIGHT Arellano  [facility]

## 2022-01-31 NOTE — H&P
[facility]  Wound Care  History and Physical    Problem List Items Addressed This Visit        Neuro    Alzheimer's dementia - Primary    Current Assessment & Plan     Patient has Alzheimer's dementia with progressive worsening.  She has been bed-bound for the past month.  She has had significant decrease in her food intake.  She has lost 60 lb since May of last year.  According to the daughter, she often refuses food.  Given her overall condition and multiple problems, I have recommended hospice care.  This was discussed in detail with the patient's daughter.            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  The patient has Alzheimer's to mention she is unable to provide any history.  The patient moans frequently complaining of pain and asking for help.  She complained of pain in the right foot which led to noticing the wound.  The daughter indicated she found purulent drainage around all the toes.  Patient has no fever or chills.         Current Assessment & Plan     Examination of the wound revealed protrusion of bone through the skin.  The interphalangeal joint was open at the site of the wound.  There is no significant purulence noted.  Based on the patient's current condition of dementia and overall debility with associated poor nutrition, conservative therapy will be undertaken with alcohol and Betadine daily.  The family will proceed with hospice care.            Endocrine    Severe malnutrition    Overview     She has had significant decrease in her food intake.  She has lost 60 lb since May of last year.  According to the daughter, she often refuses food.           Current Assessment & Plan     Patient's family does not wish to pursue a feeding tube.  Nutrition  is expected to only worsen as the patient is refusing eating.  Patient's family is likely to pursue hospice.            Other    Pressure injury of right buttock, stage 2    Overview     Patient noted to have a wounds to the buttocks last week by the daughter.  Patient does apparently complain of some discomfort associated with buttock wounds.  There is not been any significant drainage.         Current Assessment & Plan     Barrier cream daily.  Avoid pressure with frequent rotation on sides.         Pressure injury of left buttock, unstageable    Overview     Patient noted to have a wounds to the buttocks last week by the daughter.  Patient does apparently complain of some discomfort associated with buttock wounds.  There is not been any significant drainage.           Current Assessment & Plan     Foam dressing Monday Wednesday Friday and as needed.  Offload with frequent turning on sides.         Immobility            History:    Past Medical History:   Diagnosis Date    Anxiety     Diabetes mellitus type II, controlled     Dizziness     Hyperlipidemia     Hypertension     Stroke        Past Surgical History:   Procedure Laterality Date    BREAST LUMPECTOMY      TOTAL KNEE ARTHROPLASTY         No family history on file.     reports that she quit smoking about 61 years ago. Her smoking use included cigarettes. She has a 10.00 pack-year smoking history. She has never used smokeless tobacco. She reports that she does not drink alcohol and does not use drugs.    has a current medication list which includes the following prescription(s): alcohol swabs, aspirin, atorvastatin, clopidogrel, santyl, cyanocobalamin, donepezil, losartan, memantine, metformin, multivitamin, pyridoxine (vitamin b6), quetiapine, trazodone, and vitamin d.    Allergies:  Morphine    Review of Systems:  ROS      Vitals:    01/31/22 1603   BP: (!) 92/46   Pulse: 75   Resp: 17   Temp: 97 °F (36.1 °C)         BMI:  There is no height or  weight on file to calculate BMI.    Physical Exam:  Physical Exam    A1C:  No results for input(s): HGBA1C in the last 2160 hours.  BMP:  Recent Labs   Lab Result Units 12/23/21  1526   Glucose mg/dL 155*   Sodium mmol/L 142   Potassium mmol/L 3.7   Chloride mmol/L 104   CO2 mmol/L 29   BUN mg/dL 14   Creatinine mg/dL 0.8   Calcium mg/dL 9.1      CBC:  Recent Labs   Lab Result Units 12/23/21  1526   WBC K/uL 12.43   RBC M/uL 4.41   Hemoglobin g/dL 13.1   Hematocrit % 40.9   Platelets K/uL 278   MCV fL 93   MCH pg 29.7   MCHC g/dL 32.0     CMP:  Recent Labs   Lab Result Units 12/23/21  1526   Glucose mg/dL 155*   Calcium mg/dL 9.1   Albumin g/dL 2.9*   Total Protein g/dL 5.9*   Sodium mmol/L 142   Potassium mmol/L 3.7   CO2 mmol/L 29   Chloride mmol/L 104   BUN mg/dL 14   Alkaline Phosphatase U/L 60   ALT U/L 13   AST U/L 20   Total Bilirubin mg/dL 0.6     PREALBUMIN:  No results for input(s): PREALBUMIN in the last 2160 hours.  WOUND CULTURES:  No results for input(s): LABAERO in the last 2160 hours.        Plan:  See Wound Docs note for plan and follow up.        Bonifacio BRIGHT Hebert [facility]

## 2022-01-31 NOTE — ASSESSMENT & PLAN NOTE
Examination of the wound revealed protrusion of bone through the skin.  The interphalangeal joint was open at the site of the wound.  There is no significant purulence noted.  Based on the patient's current condition of dementia and overall debility with associated poor nutrition, conservative therapy will be undertaken with alcohol and Betadine daily.  The family will proceed with hospice care.

## 2022-01-31 NOTE — PROGRESS NOTES
[facility]  Wound Care  Progress Note    Problem List Items Addressed This Visit        Neuro    Alzheimer's dementia - Primary    Current Assessment & Plan     Patient has Alzheimer's dementia with progressive worsening.  She has been bed-bound for the past month.  She has had significant decrease in her food intake.  She has lost 60 lb since May of last year.  According to the daughter, she often refuses food.  Given her overall condition and multiple problems, I have recommended hospice care.  This was discussed in detail with the patient's daughter.            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  The patient has Alzheimer's to mention she is unable to provide any history.  The patient moans frequently complaining of pain and asking for help.  She complained of pain in the right foot which led to noticing the wound.  The daughter indicated she found purulent drainage around all the toes.  Patient has no fever or chills.         Current Assessment & Plan     Examination of the wound revealed protrusion of bone through the skin.  The interphalangeal joint was open at the site of the wound.  There is no significant purulence noted.  Based on the patient's current condition of dementia and overall debility with associated poor nutrition, conservative therapy will be undertaken with alcohol and Betadine daily.  The family will proceed with hospice care.            Cardiac/Vascular    Atherosclerosis of native artery of right lower extremity    Overview     Patient does not have any palpable pulses in the right foot nor does she have dopplerable signals in the right foot.            Endocrine    Severe malnutrition    Overview     She has had significant decrease in her  food intake.  She has lost 60 lb since May of last year.  According to the daughter, she often refuses food.  Albumin is 2.2         Current Assessment & Plan     Patient's family does not wish to pursue a feeding tube.  Nutrition is expected to only worsen as the patient is refusing eating.  Patient's family is likely to pursue hospice.            Other    Pressure injury of right buttock, stage 2    Overview     Patient noted to have a wounds to the buttocks last week by the daughter.  Patient does apparently complain of some discomfort associated with buttock wounds.  There is not been any significant drainage.         Current Assessment & Plan     Barrier cream daily.  Avoid pressure with frequent rotation on sides.         Pressure injury of left buttock, unstageable    Overview     Patient noted to have a wounds to the buttocks last week by the daughter.  Patient does apparently complain of some discomfort associated with buttock wounds.  There is not been any significant drainage.           Current Assessment & Plan     Foam dressing Monday Wednesday Friday and as needed.  Offload with frequent turning on sides.         Immobility          See wound doc progress notes. Documents will be scanned.        Bonifacio Arellano  [facility]

## 2022-01-31 NOTE — ASSESSMENT & PLAN NOTE
Patient's family does not wish to pursue a feeding tube.  Nutrition is expected to only worsen as the patient is refusing eating.  Patient's family is likely to pursue hospice.